# Patient Record
Sex: FEMALE | Race: WHITE | Employment: PART TIME | URBAN - METROPOLITAN AREA
[De-identification: names, ages, dates, MRNs, and addresses within clinical notes are randomized per-mention and may not be internally consistent; named-entity substitution may affect disease eponyms.]

---

## 2019-12-17 ENCOUNTER — OFFICE VISIT (OUTPATIENT)
Dept: NEUROLOGY | Age: 19
End: 2019-12-17

## 2019-12-17 VITALS
OXYGEN SATURATION: 98 % | BODY MASS INDEX: 32.96 KG/M2 | HEIGHT: 67 IN | DIASTOLIC BLOOD PRESSURE: 60 MMHG | SYSTOLIC BLOOD PRESSURE: 100 MMHG | WEIGHT: 210 LBS | RESPIRATION RATE: 16 BRPM | HEART RATE: 66 BPM

## 2019-12-17 DIAGNOSIS — G43.809 MIGRAINE VARIANT WITH HEADACHE: ICD-10-CM

## 2019-12-17 DIAGNOSIS — R42 VERTIGO: Primary | ICD-10-CM

## 2019-12-17 RX ORDER — TOPIRAMATE 50 MG/1
TABLET, FILM COATED ORAL
Qty: 60 TAB | Refills: 5 | Status: SHIPPED | OUTPATIENT
Start: 2019-12-17 | End: 2021-09-08

## 2019-12-17 RX ORDER — FLUOXETINE HYDROCHLORIDE 40 MG/1
CAPSULE ORAL DAILY
COMMUNITY
End: 2021-09-08

## 2019-12-17 NOTE — PROGRESS NOTES
Chief Complaint   Patient presents with    Headache       Referred by: Dr. Opal Tracy is a 68-year-old woman with depression here for vertigo. She was referred by ENT. In July she developed non-positional vertigo present 2 or 3 days/week. Nausea. She has noticed an accompanying headache bifrontal sometimes throbbing pounding with light sensitivity which is new. She has symptoms regardless of movement. Symptoms can last several hours. No double vision or weakness. She works as a  but does not exercise. No history of headaches prior to this onset. No history of headaches prior, meclizine without improvement. Mother reports normal growth and development. Review of Systems   Eyes: Positive for photophobia. Negative for double vision. Gastrointestinal: Positive for nausea. Neurological: Positive for dizziness and headaches. All other systems reviewed and are negative.       Past Medical History:   Diagnosis Date    Chronic mental illness      Family History   Problem Relation Age of Onset    Heart Disease Maternal Grandmother     Neuropathy Maternal Grandmother     Cancer Maternal Grandfather      Social History     Socioeconomic History    Marital status: SINGLE     Spouse name: Not on file    Number of children: Not on file    Years of education: Not on file    Highest education level: Not on file   Occupational History    Not on file   Social Needs    Financial resource strain: Not on file    Food insecurity:     Worry: Not on file     Inability: Not on file    Transportation needs:     Medical: Not on file     Non-medical: Not on file   Tobacco Use    Smoking status: Never Smoker    Smokeless tobacco: Never Used   Substance and Sexual Activity    Alcohol use: Never     Frequency: Never    Drug use: Not on file    Sexual activity: Not on file   Lifestyle    Physical activity:     Days per week: Not on file     Minutes per session: Not on file    Stress: Not on file   Relationships    Social connections:     Talks on phone: Not on file     Gets together: Not on file     Attends Rastafarian service: Not on file     Active member of club or organization: Not on file     Attends meetings of clubs or organizations: Not on file     Relationship status: Not on file    Intimate partner violence:     Fear of current or ex partner: Not on file     Emotionally abused: Not on file     Physically abused: Not on file     Forced sexual activity: Not on file   Other Topics Concern    Not on file   Social History Narrative    Not on file     Current Outpatient Medications   Medication Sig    FLUoxetine (PROZAC) 40 mg capsule Take  by mouth daily.  meclizine HCl (MECLIZINE PO) Take  by mouth.  topiramate (TOPAMAX) 50 mg tablet 1/2 tab twice daily then increase to 1 tab twice daily after 5 days  Indications: Migraine Prevention     No current facility-administered medications for this visit. No Known Allergies      Neurologic Exam     Mental Status   Oriented to person, place, and time. Cranial Nerves   Cranial nerves II through XII intact. No ODE     Motor Exam     Strength   Strength 5/5 throughout. Sensory Exam   Light touch normal.     Gait, Coordination, and Reflexes     Gait  Gait: normal    Coordination   Finger to nose coordination: normal    Tremor   Resting tremor: absent    Reflexes   Right brachioradialis: 2+  Left brachioradialis: 2+  Right biceps: 2+  Left biceps: 2+  Right triceps: 2+  Left triceps: 2+  Right patellar: 3+  Left patellar: 3+  Right achilles: 2+  Left achilles: 2+    Physical Exam   Constitutional: She is oriented to person, place, and time. She appears well-developed and well-nourished. Cardiovascular: Normal rate. Pulmonary/Chest: Effort normal.   Neurological: She is oriented to person, place, and time. She has normal strength.  She has a normal Finger-Nose-Finger Test. Gait normal.   Reflex Scores:       Tricep reflexes are 2+ on the right side and 2+ on the left side. Bicep reflexes are 2+ on the right side and 2+ on the left side. Brachioradialis reflexes are 2+ on the right side and 2+ on the left side. Patellar reflexes are 3+ on the right side and 3+ on the left side. Achilles reflexes are 2+ on the right side and 2+ on the left side. Skin: Skin is warm and dry. Psychiatric: Her behavior is normal.   Vitals reviewed. Visit Vitals  /60   Pulse 66   Resp 16   Ht 5' 7\" (1.702 m)   Wt 95.3 kg (210 lb)   SpO2 98%   BMI 32.89 kg/m²       No labs     No imaging    Assessment and Plan   Diagnoses and all orders for this visit:    1. Vertigo  -     MRI BRAIN WO CONT; Future    2. Migraine variant with headache    Other orders  -     topiramate (TOPAMAX) 50 mg tablet; 1/2 tab twice daily then increase to 1 tab twice daily after 5 days  Indications: Migraine Prevention      17-year-old woman having new onset headaches and non-positional vertigo. Given her age and symptoms we need to make sure were not dealing with a central issue like multiple sclerosis. Concomitantly I am going to start treating her for frequent migraines. Start Topamax twice daily. Side effects have been discussed with her in detail. Questions for her and her mother answered. Keep a headache log. Start exercising. We will see them in follow-up. I reviewed and decided to continue the current medications. This clinical note was dictated with an electronic dictation software that can make unintentional errors. If there are any questions, please contact me directly for clarification. A notice of this visit/encounter being completed has been sent electronically to the patient's PCP and/or referring provider.      [address] [facility], [facility] [address]  Diplomate DAVID

## 2019-12-17 NOTE — PATIENT INSTRUCTIONS
PRESCRIPTION REFILL POLICY [facility] Statement to Patients April 1, 2014 In an effort to ensure the large volume of patient prescription refills is processed in the most efficient and expeditious manner, we are asking our patients to assist us by calling your Pharmacy for all prescription refills, this will include also your  Mail Order Pharmacy. The pharmacy will contact our office electronically to continue the refill process. Please do not wait until the last minute to call your pharmacy. We need at least 48 hours (2days) to fill prescriptions. We also encourage you to call your pharmacy before going to  your prescription to make sure it is ready. With regard to controlled substance prescription refill requests (narcotic refills) that need to be picked up at our office, we ask your cooperation by providing us with at least 72 hours (3days) notice that you will need a refill. We will not refill narcotic prescription refill requests after 4:00pm on any weekday, Monday through Thursday, or after 2:00pm on Fridays, or on the weekends. We encourage everyone to explore another way of getting your prescription refill request processed using [employer], our patient web portal through our electronic medical record system. [employer] is an efficient and effective way to communicate your medication request directly to the office and  downloadable as an aide on your smart phone . [employer] also features a review functionality that allows you to view your medication list as well as leave messages for your physician. Are you ready to get connected? If so please review the attatched instructions or speak to any of our staff to get you set up right away! Thank you so much for your cooperation. Should you have any questions please contact our Practice Administrator. The Physicians and Staff,  [facility]

## 2019-12-17 NOTE — LETTER
12/17/19 Patient: Bre Javier YOB: 2000 Date of Visit: 12/17/2019 Rolanda Malin, [address] [facility] [address] [place] [id number] VIA Facsimile: [phone] Dear GEORGIA Minaya, Thank you for referring Ms. Verónica Mckeon to [address] [place] [address] for evaluation. My notes for this consultation are attached. If you have questions, please do not hesitate to call me. I look forward to following your patient along with you. Sincerely, [address] [facility], DO 
 
12/17/2019 Patient:  Bre Javier YOB: 2000 Date of Visit: 12/17/2019 Dear GEORGIA Minaya 
[address] [facility] [address] [place] [address] VIA Facsimile: [phone] 
 : 
 
 
I was requested by GEORGIA Bruner to evaluate Ms. Cristina Butler  for Chief Complaint Patient presents with  
 Headache Alfredo Angry I am recommending the following:  
 
Diagnoses and all orders for this visit: 1. Vertigo -     MRI BRAIN WO CONT; Future 2. Migraine variant with headache Other orders -     topiramate (TOPAMAX) 50 mg tablet; 1/2 tab twice daily then increase to 1 tab twice daily after 5 days  Indications: Migraine Prevention 
 
 
 
---------------------------------------------------------------------------------------------------------------------- Below is my encounter: Chief Complaint Patient presents with  
 Headache Referred by: Dr. Carlos Arboleda QUIANA Benton is a 53-year-old woman with depression here for vertigo. She was referred by ENT. In July she developed non-positional vertigo present 2 or 3 days/week. Nausea. She has noticed an accompanying headache bifrontal sometimes throbbing pounding with light sensitivity which is new. She has symptoms regardless of movement. Symptoms can last several hours. No double vision or weakness.   She works as a  but does not exercise. No history of headaches prior to this onset. No history of headaches prior, meclizine without improvement. Mother reports normal growth and development. Review of Systems Eyes: Positive for photophobia. Negative for double vision. Gastrointestinal: Positive for nausea. Neurological: Positive for dizziness and headaches. All other systems reviewed and are negative. Past Medical History:  
Diagnosis Date  Chronic mental illness Family History Problem Relation Age of Onset  Heart Disease Maternal Grandmother  Neuropathy Maternal Grandmother  Cancer Maternal Grandfather Social History Socioeconomic History  Marital status: SINGLE Spouse name: Not on file  Number of children: Not on file  Years of education: Not on file  Highest education level: Not on file Occupational History  Not on file Social Needs  Financial resource strain: Not on file  Food insecurity:  
  Worry: Not on file Inability: Not on file  Transportation needs:  
  Medical: Not on file Non-medical: Not on file Tobacco Use  Smoking status: Never Smoker  Smokeless tobacco: Never Used Substance and Sexual Activity  Alcohol use: Never Frequency: Never  Drug use: Not on file  Sexual activity: Not on file Lifestyle  Physical activity:  
  Days per week: Not on file Minutes per session: Not on file  Stress: Not on file Relationships  Social connections:  
  Talks on phone: Not on file Gets together: Not on file Attends Congregation service: Not on file Active member of club or organization: Not on file Attends meetings of clubs or organizations: Not on file Relationship status: Not on file  Intimate partner violence:  
  Fear of current or ex partner: Not on file Emotionally abused: Not on file Physically abused: Not on file Forced sexual activity: Not on file Other Topics Concern  Not on file Social History Narrative  Not on file Current Outpatient Medications Medication Sig  FLUoxetine (PROZAC) 40 mg capsule Take  by mouth daily.  meclizine HCl (MECLIZINE PO) Take  by mouth.  topiramate (TOPAMAX) 50 mg tablet 1/2 tab twice daily then increase to 1 tab twice daily after 5 days  Indications: Migraine Prevention No current facility-administered medications for this visit. No Known Allergies Neurologic Exam  
 
Mental Status Oriented to person, place, and time. Cranial Nerves Cranial nerves II through XII intact. No ODE Motor Exam  
 
Strength Strength 5/5 throughout. Sensory Exam  
Light touch normal.  
 
Gait, Coordination, and Reflexes Gait Gait: normal 
 
Coordination Finger to nose coordination: normal 
 
Tremor Resting tremor: absent Reflexes Right brachioradialis: 2+ Left brachioradialis: 2+ Right biceps: 2+ Left biceps: 2+ Right triceps: 2+ Left triceps: 2+ Right patellar: 3+ Left patellar: 3+ Right achilles: 2+ Left achilles: 2+ Physical Exam  
Constitutional: She is oriented to person, place, and time. She appears well-developed and well-nourished. Cardiovascular: Normal rate. Pulmonary/Chest: Effort normal.  
Neurological: She is oriented to person, place, and time. She has normal strength. She has a normal Finger-Nose-Finger Test. Gait normal.  
Reflex Scores: 
     Tricep reflexes are 2+ on the right side and 2+ on the left side. Bicep reflexes are 2+ on the right side and 2+ on the left side. Brachioradialis reflexes are 2+ on the right side and 2+ on the left side. Patellar reflexes are 3+ on the right side and 3+ on the left side. Achilles reflexes are 2+ on the right side and 2+ on the left side. Skin: Skin is warm and dry. Psychiatric: Her behavior is normal.  
Vitals reviewed. Visit Vitals /60 Pulse 66 Resp 16 Ht 5' 7\" (1.702 m) Wt 95.3 kg (210 lb) SpO2 98% BMI 32.89 kg/m² No labs No imaging Assessment and Plan Diagnoses and all orders for this visit: 1. Vertigo -     MRI BRAIN WO CONT; Future 2. Migraine variant with headache Other orders -     topiramate (TOPAMAX) 50 mg tablet; 1/2 tab twice daily then increase to 1 tab twice daily after 5 days  Indications: Migraine Prevention 17-year-old woman having new onset headaches and non-positional vertigo. Given her age and symptoms we need to make sure were not dealing with a central issue like multiple sclerosis. Concomitantly I am going to start treating her for frequent migraines. Start Topamax twice daily. Side effects have been discussed with her in detail. Questions for her and her mother answered. Keep a headache log. Start exercising. We will see them in follow-up. I reviewed and decided to continue the current medications. This clinical note was dictated with an electronic dictation software that can make unintentional errors. If there are any questions, please contact me directly for clarification. A notice of this visit/encounter being completed has been sent electronically to the patient's PCP and/or referring provider. Thank you for giving me the opportunity to assist in the care of Ms. Juan Carlos Marrero. If you have questions, please do not hesitate to contact me. Sincerely, [address] [facility], DO Neurologist 
Brain Injury Medicine Diplomate DAVID

## 2019-12-17 NOTE — PROGRESS NOTES
Ms. Daniele Nicole presents as a new patient for evaluation of dizziness, headaches and blurred vision. Depression screening done on patient.

## 2019-12-31 ENCOUNTER — HOSPITAL ENCOUNTER (OUTPATIENT)
Dept: MRI IMAGING | Age: 19
Discharge: HOME | End: 2019-12-31
Attending: PSYCHIATRY & NEUROLOGY
Payer: COMMERCIAL

## 2019-12-31 DIAGNOSIS — R42 VERTIGO: ICD-10-CM

## 2019-12-31 PROCEDURE — 70551 MRI BRAIN STEM W/O DYE: CPT

## 2020-03-17 ENCOUNTER — TELEPHONE (OUTPATIENT)
Dept: NEUROLOGY | Age: 20
End: 2020-03-17

## 2020-03-17 NOTE — TELEPHONE ENCOUNTER
Called and LVM for the patient in regards to rescheduling her appointment due to current COVID-19 scare.

## 2021-09-08 ENCOUNTER — APPOINTMENT (OUTPATIENT)
Dept: MRI IMAGING | Age: 21
End: 2021-09-08
Attending: EMERGENCY MEDICINE
Payer: COMMERCIAL

## 2021-09-08 ENCOUNTER — TRANSCRIBE ORDER (OUTPATIENT)
Dept: SCHEDULING | Age: 21
End: 2021-09-08

## 2021-09-08 ENCOUNTER — HOSPITAL ENCOUNTER (EMERGENCY)
Age: 21
Discharge: HOME | End: 2021-09-08
Attending: EMERGENCY MEDICINE
Payer: COMMERCIAL

## 2021-09-08 VITALS
WEIGHT: 230 LBS | DIASTOLIC BLOOD PRESSURE: 53 MMHG | TEMPERATURE: 98.1 F | RESPIRATION RATE: 18 BRPM | HEART RATE: 69 BPM | BODY MASS INDEX: 36.02 KG/M2 | SYSTOLIC BLOOD PRESSURE: 138 MMHG | OXYGEN SATURATION: 100 %

## 2021-09-08 DIAGNOSIS — H57.12 ACUTE LEFT EYE PAIN: Primary | ICD-10-CM

## 2021-09-08 DIAGNOSIS — H46.9 OPTIC NEURITIS: Primary | ICD-10-CM

## 2021-09-08 LAB
ALBUMIN SERPL-MCNC: 3.6 G/DL (ref 3.5–5)
ALBUMIN/GLOB SERPL: 0.7 {RATIO} (ref 1.1–2.2)
ALP SERPL-CCNC: 84 U/L (ref 45–117)
ALT SERPL-CCNC: 25 U/L (ref 12–78)
ANION GAP SERPL CALC-SCNC: 8 MMOL/L (ref 5–15)
AST SERPL-CCNC: 16 U/L (ref 15–37)
BASOPHILS # BLD: 0.1 K/UL (ref 0–0.1)
BASOPHILS NFR BLD: 1 % (ref 0–1)
BILIRUB SERPL-MCNC: 0.3 MG/DL (ref 0.2–1)
BUN SERPL-MCNC: 9 MG/DL (ref 6–20)
BUN/CREAT SERPL: 11 (ref 12–20)
CALCIUM SERPL-MCNC: 8.8 MG/DL (ref 8.5–10.1)
CHLORIDE SERPL-SCNC: 107 MMOL/L (ref 97–108)
CO2 SERPL-SCNC: 24 MMOL/L (ref 21–32)
CREAT SERPL-MCNC: 0.79 MG/DL (ref 0.55–1.02)
CRP SERPL-MCNC: 0.94 MG/DL (ref 0–0.6)
DIFFERENTIAL METHOD BLD: NORMAL
EOSINOPHIL # BLD: 0.2 K/UL (ref 0–0.4)
EOSINOPHIL NFR BLD: 2 % (ref 0–7)
ERYTHROCYTE [DISTWIDTH] IN BLOOD BY AUTOMATED COUNT: 12.8 % (ref 11.5–14.5)
ERYTHROCYTE [SEDIMENTATION RATE] IN BLOOD: 57 MM/HR (ref 0–20)
GLOBULIN SER CALC-MCNC: 4.9 G/DL (ref 2–4)
GLUCOSE SERPL-MCNC: 105 MG/DL (ref 65–100)
HCT VFR BLD AUTO: 37.7 % (ref 35–47)
HGB BLD-MCNC: 12 G/DL (ref 11.5–16)
IMM GRANULOCYTES # BLD AUTO: 0 K/UL (ref 0–0.04)
IMM GRANULOCYTES NFR BLD AUTO: 0 % (ref 0–0.5)
LYMPHOCYTES # BLD: 3.5 K/UL (ref 0.8–3.5)
LYMPHOCYTES NFR BLD: 36 % (ref 12–49)
MCH RBC QN AUTO: 27.6 PG (ref 26–34)
MCHC RBC AUTO-ENTMCNC: 31.8 G/DL (ref 30–36.5)
MCV RBC AUTO: 86.7 FL (ref 80–99)
MONOCYTES # BLD: 0.7 K/UL (ref 0–1)
MONOCYTES NFR BLD: 8 % (ref 5–13)
NEUTS SEG # BLD: 5.2 K/UL (ref 1.8–8)
NEUTS SEG NFR BLD: 54 % (ref 32–75)
NRBC # BLD: 0 K/UL (ref 0–0.01)
NRBC BLD-RTO: 0 PER 100 WBC
PLATELET # BLD AUTO: 321 K/UL (ref 150–400)
PMV BLD AUTO: 9.4 FL (ref 8.9–12.9)
POTASSIUM SERPL-SCNC: 4 MMOL/L (ref 3.5–5.1)
PROT SERPL-MCNC: 8.5 G/DL (ref 6.4–8.2)
RBC # BLD AUTO: 4.35 M/UL (ref 3.8–5.2)
SODIUM SERPL-SCNC: 139 MMOL/L (ref 136–145)
WBC # BLD AUTO: 9.7 K/UL (ref 3.6–11)

## 2021-09-08 PROCEDURE — 36415 COLL VENOUS BLD VENIPUNCTURE: CPT

## 2021-09-08 PROCEDURE — 99283 EMERGENCY DEPT VISIT LOW MDM: CPT

## 2021-09-08 PROCEDURE — 70543 MRI ORBT/FAC/NCK W/O &W/DYE: CPT

## 2021-09-08 PROCEDURE — 85652 RBC SED RATE AUTOMATED: CPT

## 2021-09-08 PROCEDURE — 85025 COMPLETE CBC W/AUTO DIFF WBC: CPT

## 2021-09-08 PROCEDURE — 74011250636 HC RX REV CODE- 250/636: Performed by: EMERGENCY MEDICINE

## 2021-09-08 PROCEDURE — A9576 INJ PROHANCE MULTIPACK: HCPCS | Performed by: EMERGENCY MEDICINE

## 2021-09-08 PROCEDURE — 80053 COMPREHEN METABOLIC PANEL: CPT

## 2021-09-08 PROCEDURE — 86140 C-REACTIVE PROTEIN: CPT

## 2021-09-08 RX ORDER — OXYBUTYNIN CHLORIDE 10 MG/1
10 TABLET, EXTENDED RELEASE ORAL DAILY
COMMUNITY
Start: 2021-08-12 | End: 2021-10-05

## 2021-09-08 RX ADMIN — GADOTERIDOL 20 ML: 279.3 INJECTION, SOLUTION INTRAVENOUS at 20:48

## 2021-09-08 NOTE — ED TRIAGE NOTES
Patient presents ambulatory to treatment area with a steady gait. Patient states she went to [facility] today due to blurred vision and pain in the left eye. Patient was noted to have optic nerve enlargement. Here for workup.

## 2021-09-09 ENCOUNTER — TELEPHONE (OUTPATIENT)
Dept: NEUROLOGY | Age: 21
End: 2021-09-09

## 2021-09-09 NOTE — TELEPHONE ENCOUNTER
Patient's mother calling stating patient is showing signs of MS and she is very worried. Unable to schedule with NP.  Schedule not available

## 2021-09-09 NOTE — PROGRESS NOTES
Discussed final result with Dr. Ben Mancilla who states no new recommendations, patient to f/u as directed at discharge.

## 2021-09-09 NOTE — ED PROVIDER NOTES
39-year-old female who presents to the ED with a complaint of visual disturbance and left eye pain over the past week. She was seen by her ophthalmologist who sent her directly to the ER for evaluation for possible optic neuritis of the left eye. The patient denies any fever, headache, neck or back pain, chest pain, shortness of breath, nausea, vomiting, diarrhea, constipation, dizziness, extremity weakness or numbness, sick contact, skin rash and recent travel. Past Medical History:   Diagnosis Date    Chronic mental illness     Overactive bladder        History reviewed. No pertinent surgical history. Family History:   Problem Relation Age of Onset    Heart Disease Maternal Grandmother     Neuropathy Maternal Grandmother     Cancer Maternal Grandfather        Social History     Socioeconomic History    Marital status: SINGLE     Spouse name: Not on file    Number of children: Not on file    Years of education: Not on file    Highest education level: Not on file   Occupational History    Not on file   Tobacco Use    Smoking status: Never Smoker    Smokeless tobacco: Never Used   Substance and Sexual Activity    Alcohol use: Never    Drug use: Not on file    Sexual activity: Not on file   Other Topics Concern    Not on file   Social History Narrative    Not on file     Social Determinants of Health     Financial Resource Strain:     Difficulty of Paying Living Expenses:    Food Insecurity:     Worried About Running Out of Food in the Last Year:     Ran Out of Food in the Last Year:    Transportation Needs:     Lack of Transportation (Medical):      Lack of Transportation (Non-Medical):    Physical Activity:     Days of Exercise per Week:     Minutes of Exercise per Session:    Stress:     Feeling of Stress :    Social Connections:     Frequency of Communication with Friends and Family:     Frequency of Social Gatherings with Friends and Family:     Attends [employer] Services:     Active Member of Clubs or Organizations:     Attends Club or Organization Meetings:     Marital Status:    Intimate Partner Violence:     Fear of Current or Ex-Partner:     Emotionally Abused:     Physically Abused:     Sexually Abused: ALLERGIES: Patient has no known allergies. Review of Systems   Constitutional: Negative. Eyes: Negative for pain. All other systems reviewed and are negative. Vitals:    09/08/21 1908 09/08/21 2027   BP: (!) 138/53    Pulse: 69    Resp: 18    Temp: 98.1 °F (36.7 °C)    SpO2: 100%    Weight:  104.3 kg (230 lb)            Physical Exam  Vitals and nursing note reviewed. Exam conducted with a chaperone present. CONSTITUTIONAL: Well-appearing; well-nourished; in no apparent distress  HEAD: Normocephalic; atraumatic  EYES: PERRL; EOM intact; conjunctiva and sclera are clear bilaterally. ENT: No rhinorrhea; normal pharynx with no tonsillar hypertrophy; mucous membranes pink/moist, no erythema, no exudate. NECK: Supple; non-tender; no cervical lymphadenopathy  CARD: Normal S1, S2; no murmurs, rubs, or gallops. Regular rate and rhythm. RESP: Normal respiratory effort; breath sounds clear and equal bilaterally; no wheezes, rhonchi, or rales. ABD: Normal bowel sounds; non-distended; non-tender; no palpable organomegaly, no masses, no bruits. Back Exam: Normal inspection; no vertebral point tenderness, no CVA tenderness. Normal range of motion. EXT: Normal ROM in all four extremities; non-tender to palpation; no swelling or deformity; distal pulses are normal, no edema. SKIN: Warm; dry; no rash.   NEURO:Alert and oriented x 3, coherent, VERONICA-XII grossly intact, sensory and motor are non-focal.        MDM  Number of Diagnoses or Management Options  Acute left eye pain  Diagnosis management comments: Assessment: Acute left eye pain with visual disturbance rule out multiple sclerosis, malignancy, thyroid disease, education, symptomatic treatment/serial exam    Plan: Lab/education, reassurance, symptomatic treatment/MRI of the brain serial exam/ Monitor and Reevaluate. Amount and/or Complexity of Data Reviewed  Clinical lab tests: ordered and reviewed  Tests in the radiology section of CPT®: ordered and reviewed  Tests in the medicine section of CPT®: ordered and reviewed  Discussion of test results with the performing providers: yes  Decide to obtain previous medical records or to obtain history from someone other than the patient: yes  Obtain history from someone other than the patient: yes  Review and summarize past medical records: yes  Discuss the patient with other providers: yes  Independent visualization of images, tracings, or specimens: yes    Risk of Complications, Morbidity, and/or Mortality  Presenting problems: moderate  Diagnostic procedures: moderate  Management options: moderate    Patient Progress  Patient progress: stable         Procedures    CONSULT NOTE:  Allison Joaquin MD spoke with Dr. Bienvenido Gonzalez of [facility]. Discussed patient's presentation, history, physical assessment, and available diagnostic results. Wants patient discharged home and will follow up in the office for outpatient reevaluation and work-up as deemed appropriate. Progress Note:   Pt has been reexamined by Júnior Velasquez MD. Pt is feeling much better. Symptoms have improved. All available results have been reviewed with pt and any available family. Pt understands sx, dx, and tx in ED. Care plan has been outlined and questions have been answered. Pt is ready to go home. Will send home on acute left eye pain instruction. . Outpatient referral with PCP/neuro-ophthalmologist as needed. Written by Júnior Velasquez MD,9:57 PM    .   .

## 2021-09-13 ENCOUNTER — HOSPITAL ENCOUNTER (INPATIENT)
Age: 21
LOS: 6 days | Discharge: HOME | DRG: 123 | End: 2021-09-19
Attending: INTERNAL MEDICINE | Admitting: INTERNAL MEDICINE
Payer: COMMERCIAL

## 2021-09-13 DIAGNOSIS — H46.9 OPTIC NEURITIS: ICD-10-CM

## 2021-09-13 DIAGNOSIS — G37.9 DEMYELINATING LESION (HCC): ICD-10-CM

## 2021-09-13 DIAGNOSIS — H46.9 LEFT OPTIC NEURITIS: Primary | ICD-10-CM

## 2021-09-13 DIAGNOSIS — F41.9 ANXIETY: ICD-10-CM

## 2021-09-13 DIAGNOSIS — H57.12 LEFT EYE PAIN: ICD-10-CM

## 2021-09-13 LAB
ALBUMIN SERPL-MCNC: 3.4 G/DL (ref 3.5–5)
ALBUMIN/GLOB SERPL: 0.6 {RATIO} (ref 1.1–2.2)
ALP SERPL-CCNC: 89 U/L (ref 45–117)
ALT SERPL-CCNC: 25 U/L (ref 12–78)
ANION GAP SERPL CALC-SCNC: 3 MMOL/L (ref 5–15)
AST SERPL-CCNC: 23 U/L (ref 15–37)
BASOPHILS # BLD: 0.1 K/UL (ref 0–0.1)
BASOPHILS NFR BLD: 1 % (ref 0–1)
BILIRUB SERPL-MCNC: 0.9 MG/DL (ref 0.2–1)
BUN SERPL-MCNC: 10 MG/DL (ref 6–20)
BUN/CREAT SERPL: 16 (ref 12–20)
CALCIUM SERPL-MCNC: 8.8 MG/DL (ref 8.5–10.1)
CHLORIDE SERPL-SCNC: 107 MMOL/L (ref 97–108)
CO2 SERPL-SCNC: 26 MMOL/L (ref 21–32)
CREAT SERPL-MCNC: 0.64 MG/DL (ref 0.55–1.02)
CRP SERPL-MCNC: 1.38 MG/DL (ref 0–0.6)
DIFFERENTIAL METHOD BLD: ABNORMAL
EOSINOPHIL # BLD: 0.2 K/UL (ref 0–0.4)
EOSINOPHIL NFR BLD: 2 % (ref 0–7)
ERYTHROCYTE [DISTWIDTH] IN BLOOD BY AUTOMATED COUNT: 12.6 % (ref 11.5–14.5)
ERYTHROCYTE [SEDIMENTATION RATE] IN BLOOD: 54 MM/HR (ref 0–20)
GLOBULIN SER CALC-MCNC: 5.3 G/DL (ref 2–4)
GLUCOSE SERPL-MCNC: 98 MG/DL (ref 65–100)
HCT VFR BLD AUTO: 36.6 % (ref 35–47)
HGB BLD-MCNC: 11.6 G/DL (ref 11.5–16)
IMM GRANULOCYTES # BLD AUTO: 0 K/UL (ref 0–0.04)
IMM GRANULOCYTES NFR BLD AUTO: 0 % (ref 0–0.5)
LYMPHOCYTES # BLD: 2.6 K/UL (ref 0.8–3.5)
LYMPHOCYTES NFR BLD: 23 % (ref 12–49)
MCH RBC QN AUTO: 27 PG (ref 26–34)
MCHC RBC AUTO-ENTMCNC: 31.7 G/DL (ref 30–36.5)
MCV RBC AUTO: 85.1 FL (ref 80–99)
MONOCYTES # BLD: 0.8 K/UL (ref 0–1)
MONOCYTES NFR BLD: 8 % (ref 5–13)
NEUTS SEG # BLD: 7.4 K/UL (ref 1.8–8)
NEUTS SEG NFR BLD: 66 % (ref 32–75)
NRBC # BLD: 0 K/UL (ref 0–0.01)
NRBC BLD-RTO: 0 PER 100 WBC
PLATELET # BLD AUTO: 286 K/UL (ref 150–400)
PMV BLD AUTO: 9.9 FL (ref 8.9–12.9)
POTASSIUM SERPL-SCNC: 5 MMOL/L (ref 3.5–5.1)
PROT SERPL-MCNC: 8.7 G/DL (ref 6.4–8.2)
RBC # BLD AUTO: 4.3 M/UL (ref 3.8–5.2)
SODIUM SERPL-SCNC: 136 MMOL/L (ref 136–145)
WBC # BLD AUTO: 11.2 K/UL (ref 3.6–11)

## 2021-09-13 PROCEDURE — 74011250637 HC RX REV CODE- 250/637: Performed by: INTERNAL MEDICINE

## 2021-09-13 PROCEDURE — 74011000258 HC RX REV CODE- 258: Performed by: INTERNAL MEDICINE

## 2021-09-13 PROCEDURE — 85652 RBC SED RATE AUTOMATED: CPT

## 2021-09-13 PROCEDURE — 80053 COMPREHEN METABOLIC PANEL: CPT

## 2021-09-13 PROCEDURE — 65270000029 HC RM PRIVATE

## 2021-09-13 PROCEDURE — 85025 COMPLETE CBC W/AUTO DIFF WBC: CPT

## 2021-09-13 PROCEDURE — 86140 C-REACTIVE PROTEIN: CPT

## 2021-09-13 PROCEDURE — 36415 COLL VENOUS BLD VENIPUNCTURE: CPT

## 2021-09-13 PROCEDURE — 99282 EMERGENCY DEPT VISIT SF MDM: CPT

## 2021-09-13 PROCEDURE — 74011250636 HC RX REV CODE- 250/636: Performed by: INTERNAL MEDICINE

## 2021-09-13 RX ORDER — ONDANSETRON 2 MG/ML
4 INJECTION INTRAMUSCULAR; INTRAVENOUS
Status: DISCONTINUED | OUTPATIENT
Start: 2021-09-13 | End: 2021-09-19 | Stop reason: HOSPADM

## 2021-09-13 RX ORDER — ACETAMINOPHEN 650 MG/1
650 SUPPOSITORY RECTAL
Status: DISCONTINUED | OUTPATIENT
Start: 2021-09-13 | End: 2021-09-19 | Stop reason: HOSPADM

## 2021-09-13 RX ORDER — ACETAMINOPHEN 325 MG/1
650 TABLET ORAL
Status: DISCONTINUED | OUTPATIENT
Start: 2021-09-13 | End: 2021-09-19 | Stop reason: HOSPADM

## 2021-09-13 RX ORDER — POLYETHYLENE GLYCOL 3350 17 G/17G
17 POWDER, FOR SOLUTION ORAL DAILY PRN
Status: DISCONTINUED | OUTPATIENT
Start: 2021-09-13 | End: 2021-09-19 | Stop reason: HOSPADM

## 2021-09-13 RX ORDER — SODIUM CHLORIDE 0.9 % (FLUSH) 0.9 %
5-40 SYRINGE (ML) INJECTION AS NEEDED
Status: DISCONTINUED | OUTPATIENT
Start: 2021-09-13 | End: 2021-09-19 | Stop reason: HOSPADM

## 2021-09-13 RX ORDER — SODIUM CHLORIDE 0.9 % (FLUSH) 0.9 %
5-40 SYRINGE (ML) INJECTION EVERY 8 HOURS
Status: DISCONTINUED | OUTPATIENT
Start: 2021-09-13 | End: 2021-09-19 | Stop reason: HOSPADM

## 2021-09-13 RX ORDER — ENOXAPARIN SODIUM 100 MG/ML
40 INJECTION SUBCUTANEOUS DAILY
Status: DISCONTINUED | OUTPATIENT
Start: 2021-09-14 | End: 2021-09-19 | Stop reason: HOSPADM

## 2021-09-13 RX ORDER — IMIPRAMINE HYDROCHLORIDE 25 MG/1
25 TABLET ORAL
Status: DISCONTINUED | OUTPATIENT
Start: 2021-09-13 | End: 2021-09-19 | Stop reason: HOSPADM

## 2021-09-13 RX ORDER — ONDANSETRON 4 MG/1
4 TABLET, ORALLY DISINTEGRATING ORAL
Status: DISCONTINUED | OUTPATIENT
Start: 2021-09-13 | End: 2021-09-19 | Stop reason: HOSPADM

## 2021-09-13 RX ADMIN — IMIPRAMINE HYDROCHLORIDE 25 MG: 25 TABLET ORAL at 23:49

## 2021-09-13 RX ADMIN — SODIUM CHLORIDE: 9 INJECTION, SOLUTION INTRAVENOUS at 23:47

## 2021-09-13 NOTE — ED PROVIDER NOTES
Patient is a 69-year-old female who presents with 11 days of pain to her left eye and approximately 7 days of blurred vision. He states she has been seen at [facility] where she had an MRI obtained. Has had 2 visits with [facility], most recent was today. Reports that they sent her to the ED for IV steroids out of concern for unilateral optic neuritis and possible admission with further neurologic work-up out of concern for MS. Denies further focal weakness, numbness. Eye Pain   Associated symptoms include pain. Pertinent negatives include no eye redness. Past Medical History:   Diagnosis Date    Chronic mental illness     Overactive bladder        No past surgical history on file. Family History:   Problem Relation Age of Onset    Heart Disease Maternal Grandmother     Neuropathy Maternal Grandmother     Cancer Maternal Grandfather        Social History     Socioeconomic History    Marital status: SINGLE     Spouse name: Not on file    Number of children: Not on file    Years of education: Not on file    Highest education level: Not on file   Occupational History    Not on file   Tobacco Use    Smoking status: Never Smoker    Smokeless tobacco: Never Used   Substance and Sexual Activity    Alcohol use: Never    Drug use: Not on file    Sexual activity: Not on file   Other Topics Concern    Not on file   Social History Narrative    Not on file     Social Determinants of Health     Financial Resource Strain:     Difficulty of Paying Living Expenses:    Food Insecurity:     Worried About Running Out of Food in the Last Year:     Ran Out of Food in the Last Year:    Transportation Needs:     Lack of Transportation (Medical):      Lack of Transportation (Non-Medical):    Physical Activity:     Days of Exercise per Week:     Minutes of Exercise per Session:    Stress:     Feeling of Stress :    Social Connections:     Frequency of Communication with Friends and Family:     Frequency of Social Gatherings with Friends and Family:     Attends Alevism Services:     Active Member of Clubs or Organizations:     Attends Club or Organization Meetings:     Marital Status:    Intimate Partner Violence:     Fear of Current or Ex-Partner:     Emotionally Abused:     Physically Abused:     Sexually Abused: ALLERGIES: Patient has no known allergies. Review of Systems   Eyes: Positive for pain and visual disturbance. Negative for redness. Vitals:    09/13/21 1701   BP: 112/75   Pulse: 94   Resp: 16   Temp: 97.5 °F (36.4 °C)   SpO2: 99%   Weight: 106.6 kg (235 lb)   Height: 5' 7\" (1.702 m)            Physical Exam  Vitals and nursing note reviewed. Constitutional:       General: She is not in acute distress. Appearance: She is well-developed. She is not diaphoretic. HENT:      Head: Normocephalic and atraumatic. Nose: Nose normal.   Eyes:      Extraocular Movements: Extraocular movements intact. Conjunctiva/sclera: Conjunctivae normal.      Right eye: Right conjunctiva is not injected. Left eye: Left conjunctiva is not injected. Pupils: Pupils are equal, round, and reactive to light. Cardiovascular:      Rate and Rhythm: Normal rate and regular rhythm. Heart sounds: Normal heart sounds. Pulmonary:      Effort: Pulmonary effort is normal.      Breath sounds: Normal breath sounds. Abdominal:      General: There is no distension. Palpations: Abdomen is soft. Tenderness: There is no abdominal tenderness. Musculoskeletal:         General: No tenderness. Cervical back: Neck supple. Skin:     General: Skin is warm and dry. Neurological:      Mental Status: She is alert and oriented to person, place, and time. GCS: GCS eye subscore is 4. GCS verbal subscore is 5. GCS motor subscore is 6. Cranial Nerves: No cranial nerve deficit. Sensory: No sensory deficit.       Coordination: Coordination normal.          [facility]  ED Course as of Sep 13 1950   Mon Sep 13, 2021   1727 Pt reports pain to her L eye x 11 days and blurred vision x 7 days. States she was seen by [facility] earlier today. States she had MRI and blood work performed on Wednesday. Presents with paperwork from [facility]. States they sent her here for neurology consultation for expedited workup of unilateral optic neuritis and consideration of trial of IV steroid. [MW]      ED Course User Index  [MW] King Wheatley PA-C     Labs returned showing WBC 11.2, elevated ESR of 54, CRP 1.38 concern for optic neuritis, possible MS. Will admit to the hospitalist service for further care and assessment. Procedures    Perfect Serve Consult for Admission  7:49 PM    ED Room Number: CW/CW  Patient Name and age:  Xochilt Baeza 23 y.o.  female  Working Diagnosis:   1. Left optic neuritis    2. Left eye pain        COVID-19 Suspicion:  no  Sepsis present:  no  Reassessment needed: no  Code Status:  Full Code  Readmission: no  Isolation Requirements:  no  Recommended Level of Care:  med/surg  Department:[facility] ED - [phone]  Other: 25-year-old female presents for evaluation of suspected left optic neuritis from [facility]. Reportedly had MRI last week which showed findings concerning for it versus possible MS. Will likely need more MRI eval and will need neurology consultation and IV steroids. Labs consistent with it with elevated ESR/CRP.

## 2021-09-13 NOTE — ED TRIAGE NOTES
Patient reports left optic neuritis diagnosed by [address] [place] [address]. Reports pain to the left eye with blurry vision. Patient was sent to ED for IV steroids and neuro consult to R/O MS. Patient is ambulatory in Triage.

## 2021-09-14 ENCOUNTER — APPOINTMENT (OUTPATIENT)
Dept: MRI IMAGING | Age: 21
DRG: 123 | End: 2021-09-14
Attending: PSYCHIATRY & NEUROLOGY
Payer: COMMERCIAL

## 2021-09-14 PROCEDURE — 72157 MRI CHEST SPINE W/O & W/DYE: CPT

## 2021-09-14 PROCEDURE — 74011250636 HC RX REV CODE- 250/636: Performed by: INTERNAL MEDICINE

## 2021-09-14 PROCEDURE — 74011250636 HC RX REV CODE- 250/636

## 2021-09-14 PROCEDURE — 36415 COLL VENOUS BLD VENIPUNCTURE: CPT

## 2021-09-14 PROCEDURE — 74011000258 HC RX REV CODE- 258: Performed by: INTERNAL MEDICINE

## 2021-09-14 PROCEDURE — 99255 IP/OBS CONSLTJ NEW/EST HI 80: CPT | Performed by: PSYCHIATRY & NEUROLOGY

## 2021-09-14 PROCEDURE — 65270000029 HC RM PRIVATE

## 2021-09-14 PROCEDURE — 72156 MRI NECK SPINE W/O & W/DYE: CPT

## 2021-09-14 PROCEDURE — 74011250636 HC RX REV CODE- 250/636: Performed by: RADIOLOGY

## 2021-09-14 PROCEDURE — 83520 IMMUNOASSAY QUANT NOS NONAB: CPT

## 2021-09-14 PROCEDURE — 74011250637 HC RX REV CODE- 250/637: Performed by: INTERNAL MEDICINE

## 2021-09-14 PROCEDURE — 74011000258 HC RX REV CODE- 258

## 2021-09-14 PROCEDURE — A9576 INJ PROHANCE MULTIPACK: HCPCS | Performed by: RADIOLOGY

## 2021-09-14 RX ORDER — IMIPRAMINE HYDROCHLORIDE 25 MG/1
25 TABLET ORAL
COMMUNITY

## 2021-09-14 RX ADMIN — Medication 10 ML: at 02:34

## 2021-09-14 RX ADMIN — SODIUM CHLORIDE: 9 INJECTION, SOLUTION INTRAVENOUS at 18:06

## 2021-09-14 RX ADMIN — IMIPRAMINE HYDROCHLORIDE 25 MG: 25 TABLET ORAL at 21:59

## 2021-09-14 RX ADMIN — SODIUM CHLORIDE: 9 INJECTION, SOLUTION INTRAVENOUS at 18:08

## 2021-09-14 RX ADMIN — GADOTERIDOL 20 ML: 279.3 INJECTION, SOLUTION INTRAVENOUS at 14:06

## 2021-09-14 RX ADMIN — Medication 10 ML: at 21:59

## 2021-09-14 RX ADMIN — Medication 10 ML: at 18:08

## 2021-09-14 RX ADMIN — Medication 10 ML: at 06:22

## 2021-09-14 RX ADMIN — ENOXAPARIN SODIUM 40 MG: 40 INJECTION SUBCUTANEOUS at 09:41

## 2021-09-14 RX ADMIN — SODIUM CHLORIDE: 9 INJECTION, SOLUTION INTRAVENOUS at 06:20

## 2021-09-14 NOTE — ROUTINE PROCESS
TRANSFER - OUT REPORT:    Verbal report given to [facility] RN(name) on Landon Mcdonald  being transferred to 4th floor (unit) for routine progression of care       Report consisted of patients Situation, Background, Assessment and   Recommendations(SBAR). Information from the following report(s) SBAR, ED Summary, [facility] and Recent Results was reviewed with the receiving nurse. Lines:   Peripheral IV 25/17/42 Left Basilic (Active)   Site Assessment Clean, dry, & intact 09/13/21 2319   Phlebitis Assessment 0 09/13/21 2319   Infiltration Assessment 0 09/13/21 2319   Dressing Status Clean, dry, & intact 09/13/21 2319   Dressing Type Transparent;Tape 09/13/21 2319   Hub Color/Line Status Pink 09/13/21 2319        Opportunity for questions and clarification was provided.       Patient transported with:   [facility]

## 2021-09-14 NOTE — H&P
Hospitalist Admission Note    NAME: Nando Beckett   :  2000   MRN:  [id number]     Date/Time:  2021 8:32 PM    Patient PCP: Partha Hand, [address]  ________________________________________________________________________    Given the patient's current clinical presentation, I have a high level of concern for decompensation if discharged from the emergency department. Complex decision making was performed, which includes reviewing the patient's available past medical records, laboratory results, and x-ray films. My assessment of this patient's clinical condition and my plan of care is as follows. Assessment / Plan:    #Acute optic neuritis: Reviewed Ophtho notes from today indicating highly likely diagnosis based on nerve edema, MRI findings. She endorses balance issues but PEx is unremarkable today and prior MRIs w/o obvious MS findings. She endorses bilateral paresthesia, but not weakness, but NMO possible. - Start methylpred 25mg IV q6h   - Neuro c/s for consideration of LP, further imaging   - Ophtho rec labs include:     - anti-aquaporin-4, MOG, TB quantiferron    #Nocturnal enuresis: Recently started imipramine    #Obesity: Counseled; monitor BG on steroids        I have personally reviewed the radiographs, laboratory data in Epic and decisions and statements above are based partially on this personal interpretation. Code Status: Full Code  DVT Prophylaxis: hold for possible LP  GI Prophylaxis: not indicated       Subjective:   CHIEF COMPLAINT: \"L eye pain, blurry vision\"    HISTORY OF PRESENT ILLNESS:     Xavier Horn is a 23 y.o.  F with minimal PMHx presents from Neurophthalmology with concern for optic neuritis. She reports that she began having dull OS pain about 14 days, ago, which became quite painful 11 days ago, and then started having vision loss about 7 days ago. She presented to [facility] at which time MRI brain was obtained and expedited neuroptho was coordinated.        Past Medical History:   Diagnosis Date    Chronic mental illness     Overactive bladder       No past surgical history on file. Social History     Tobacco Use    Smoking status: Never Smoker    Smokeless tobacco: Never Used   Substance Use Topics    Alcohol use: Never      Family History   Problem Relation Age of Onset    Heart Disease Maternal Grandmother     Neuropathy Maternal Grandmother     Cancer Maternal Grandfather         No Known Allergies     Prior to Admission medications    Medication Sig Start Date End Date Taking? Authorizing Provider   oxybutynin chloride XL (DITROPAN XL) 10 mg CR tablet Take 10 mg by mouth daily.  8/12/21   Other, MD Jared     REVIEW OF SYSTEMS:  See HPI for details  General: negative for fever, chills, sweats, weakness, weight loss  Eyes: ++ blurred vision, eye pain, loss of vision, diplopia  Ear Nose and Throat: negative for rhinorrhea, pharyngitis, otalgia, tinnitus, speech or swallowing difficulties  Respiratory:  negative for pleuritic pain, cough, sputum production, wheezing, SOB, REYNAGA  Cardiology:  negative for chest pain, palpitations, orthopnea, PND, edema, syncope   Gastrointestinal: negative for abdominal pain, N/V, dysphagia, change in bowel habits, bleeding  Genitourinary: negative for frequency, urgency, dysuria, hematuria, incontinence  Muskuloskeletal : negative for arthralgia, myalgia  Hematology: negative for easy bruising, bleeding, lymphadenopathy  Dermatological: negative for rash, ulceration, mole change, new lesion  Endocrine: negative for hot flashes or polydipsia  Neurological: negative for headache,+ dizziness, confusion, focal weakness, +paresthesia, memory loss, +gait disturbance  Psychological: negative for anxiety, depression, agitation      Objective:   VITALS:    Visit Vitals  /75 (BP 1 Location: Right upper arm, BP Patient Position: At rest)   Pulse 94   Temp 97.5 °F (36.4 °C)   Resp 16   Ht 5' 7\" (1.702 m)   Wt 106.6 kg (235 lb)   SpO2 99% BMI 36.81 kg/m²     PHYSICAL EXAM:    Physical Exam:    Gen: Well-developed, well-nourished, in no acute distress  HEENT:  Pink conjunctivae, PERRL, hearing intact to voice, moist mucous membranes  Neck: Supple, without masses, thyroid non-tender  Resp: No accessory muscle use, clear breath sounds without wheezes rales or rhonchi  Card: No murmurs, normal S1, S2 without thrills, bruits or peripheral edema  Abd:  Soft, non-tender, non-distended, normoactive bowel sounds are present, no palpable organomegaly and no detectable hernias  Lymph:  No cervical or inguinal adenopathy  Musc: No cyanosis or clubbing  Skin: No rashes or ulcers, skin turgor is good  Neuro:  Cranial nerves are grossly intact, no focal motor weakness, follows commands appropriately, neg romberg, no pronator drif; heel toe fine  Psych:  Good insight, oriented to person, place and time, alert          _______________________________________________________________________  Care Plan discussed with:    Comments   Patient x Discussed with patient in room. POC outlined and Questions answered    Family  x    RN x    Care Manager                    Consultant:  charito MYRICK MD   _______________________________________________________________________  Recommended Disposition:   Home with Family x   HH/PT/OT/RN    SNF/LTC    KARISSA    ________________________________________________________________________  TOTAL TIME:  35 Minutes        Comments   >50% of visit spent in counseling and coordination of care  Chart reviewed  Discussion with patient and/or family and questions answered     ________________________________________________________________________  Signed: Roberta Monge MD    This note will not be viewable in [address] Procedures: see electronic medical records for all procedures/Xrays and details which were not copied into this note but were reviewed prior to creation of Plan.     LAB DATA REVIEWED:    Recent Results (from the past 24 hour(s)) CBC WITH AUTOMATED DIFF    Collection Time: 09/13/21  6:37 PM   Result Value Ref Range    WBC 11.2 (H) 3.6 - 11.0 K/uL    RBC 4.30 3.80 - 5.20 M/uL    HGB 11.6 11.5 - 16.0 g/dL    HCT 36.6 35.0 - 47.0 %    MCV 85.1 80.0 - 99.0 FL    MCH 27.0 26.0 - 34.0 PG    MCHC 31.7 30.0 - 36.5 g/dL    RDW 12.6 11.5 - 14.5 %    PLATELET 733 444 - 492 K/uL    MPV 9.9 8.9 - 12.9 FL    NRBC 0.0 0  WBC    ABSOLUTE NRBC 0.00 0.00 - 0.01 K/uL    NEUTROPHILS 66 32 - 75 %    LYMPHOCYTES 23 12 - 49 %    MONOCYTES 8 5 - 13 %    EOSINOPHILS 2 0 - 7 %    BASOPHILS 1 0 - 1 %    IMMATURE GRANULOCYTES 0 0.0 - 0.5 %    ABS. NEUTROPHILS 7.4 1.8 - 8.0 K/UL    ABS. LYMPHOCYTES 2.6 0.8 - 3.5 K/UL    ABS. MONOCYTES 0.8 0.0 - 1.0 K/UL    ABS. EOSINOPHILS 0.2 0.0 - 0.4 K/UL    ABS. BASOPHILS 0.1 0.0 - 0.1 K/UL    ABS. IMM. GRANS. 0.0 0.00 - 0.04 K/UL    DF AUTOMATED     METABOLIC PANEL, COMPREHENSIVE    Collection Time: 09/13/21  6:37 PM   Result Value Ref Range    Sodium 136 136 - 145 mmol/L    Potassium 5.0 3.5 - 5.1 mmol/L    Chloride 107 97 - 108 mmol/L    CO2 26 21 - 32 mmol/L    Anion gap 3 (L) 5 - 15 mmol/L    Glucose 98 65 - 100 mg/dL    BUN 10 6 - 20 MG/DL    Creatinine 0.64 0.55 - 1.02 MG/DL    BUN/Creatinine ratio 16 12 - 20      GFR est AA >60 >60 ml/min/1.73m2    GFR est non-AA >60 >60 ml/min/1.73m2    Calcium 8.8 8.5 - 10.1 MG/DL    Bilirubin, total 0.9 0.2 - 1.0 MG/DL    ALT (SGPT) 25 12 - 78 U/L    AST (SGOT) 23 15 - 37 U/L    Alk.  phosphatase 89 45 - 117 U/L    Protein, total 8.7 (H) 6.4 - 8.2 g/dL    Albumin 3.4 (L) 3.5 - 5.0 g/dL    Globulin 5.3 (H) 2.0 - 4.0 g/dL    A-G Ratio 0.6 (L) 1.1 - 2.2     SED RATE (ESR)    Collection Time: 09/13/21  6:37 PM   Result Value Ref Range    Sed rate, automated 54 (H) 0 - 20 mm/hr   C REACTIVE PROTEIN, QT    Collection Time: 09/13/21  6:37 PM   Result Value Ref Range    C-Reactive protein 1.38 (H) 0.00 - 0.60 mg/dL

## 2021-09-14 NOTE — PROGRESS NOTES
Reason for Admission:  Opic neuritis                   RUR Score:   7%               Plan for utilizing home health:     No     PCP: First and Last name:  Patricia Unger   Name of Practice: [facility]   Are you a current patient: Yes/No: yes   Approximate date of last visit: within the past year   Can you participate in a virtual visit with your PCP:                     Current Advanced Directive/Advance Care Plan: Full Code      Healthcare Decision Maker:  Romana Slick, mother - [phone]                           Transition of Care Plan:        Initial assessment information was obtained by phone from patient's mother, Romana Slick. Patient lives with her parents and sister at charted address in [place]. She is independent with ADLs and drives. Patient does not own/use any DME and uses [facility]. pharmacy on [address] 1. Await further evaluation  2. Neurology consulted  3. CM will follow   4. Home with family when medically stable  5. Outpatient follow-up  6. Family will provide transportation at discharge    Care Management Interventions  PCP Verified by CM:  Yes  Support Systems: Parent(s)  Confirm Follow Up Transport: Family  The Plan for Transition of Care is Related to the Following Treatment Goals : Opic neuritis  Discharge Location  Discharge Placement: Home with family assistance     Bennie Rizo LCSW

## 2021-09-14 NOTE — PROGRESS NOTES
[facility]  [address] [facility], [address] [place] [address]  [phone]      Medical Progress Note      NAME: Jacqueline Clark   :  2000  MRM:  [id number]    Date/Time of service: 2021         Subjective:     Chief Complaint:  Patient was personally seen and examined by me during this time period. Chart reviewed. F/u optic neuritis. Patient reports blurry and loss of vision. She also reports occasional nausea, bladder incontinence and paraesthesia. Objective:       Vitals:       Last 24hrs VS reviewed since prior progress note.  Most recent are:    Visit Vitals  /71 (BP 1 Location: Right upper arm, BP Patient Position: Sitting)   Pulse 89   Temp 98.2 °F (36.8 °C)   Resp 20   Ht 5' 7\" (1.702 m)   Wt 106.6 kg (235 lb)   SpO2 97%   BMI 36.81 kg/m²     SpO2 Readings from Last 6 Encounters:   21 97%   21 100%   19 98%            Intake/Output Summary (Last 24 hours) at 2021 1437  Last data filed at 2021 1410  Gross per 24 hour   Intake 320 ml   Output    Net 320 ml        Exam:     Physical Exam:    Gen:  Well-developed, well-nourished, in no acute distress  HEENT:  Pink conjunctivae, PERRL, hearing intact to voice, moist mucous membranes  Neck:  Supple, no tenderness to palpation   Resp:  No accessory muscle use, clear breath sounds without wheezes rales or rhonchi  Card:  RRR, No murmurs, normal S1, S2, no peripheral edema  Abd:  Soft, non-tender, non-distended, normoactive bowel sounds are present  Musc:  No cyanosis or clubbing  Skin:  No rashes or ulcers, skin turgor is good  Neuro:  Cranial nerves 3-12 are grossly intact, no focal weakness, follows commands appropriately  Psych:  Oriented to person, place, and time, Alert with good insight      Medications Reviewed: (see below)    Lab Data Reviewed: (see below)    ______________________________________________________________________    Medications:     Current Facility-Administered Medications   Medication Dose Route Frequency    [START ON 9/15/2021] methylPREDNISolone (Solu-MEDROL) 1 g in 100 mL NS MBP  1 g IntraVENous QAM    sodium chloride (NS) flush 5-40 mL  5-40 mL IntraVENous Q8H    sodium chloride (NS) flush 5-40 mL  5-40 mL IntraVENous PRN    acetaminophen (TYLENOL) tablet 650 mg  650 mg Oral Q6H PRN    Or    acetaminophen (TYLENOL) suppository 650 mg  650 mg Rectal Q6H PRN    polyethylene glycol (MIRALAX) packet 17 g  17 g Oral DAILY PRN    ondansetron (ZOFRAN ODT) tablet 4 mg  4 mg Oral Q8H PRN    Or    ondansetron (ZOFRAN) injection 4 mg  4 mg IntraVENous Q6H PRN    enoxaparin (LOVENOX) injection 40 mg  40 mg SubCUTAneous DAILY    methylPREDNISolone (PF) 250 mg in 0.9% sodium chloride 100 mL IVPB   IntraVENous Q6H    imipramine (TOFRANIL) tablet 25 mg  25 mg Oral QHS          Lab Review:     Recent Labs     09/13/21  1837   WBC 11.2*   HGB 11.6   HCT 36.6        Recent Labs     09/13/21  1837      K 5.0      CO2 26   GLU 98   BUN 10   CREA 0.64   CA 8.8   ALB 3.4*   TBILI 0.9   ALT 25     No results found for: GLUCPOC       Assessment / Plan:     Acute optic neuritis: based on nerve edema, MRI report OP 9/08/21. MRI C-spine and T-spine pending to evaluate for cord lesions. Lab work for demyelinating disease including CAROL, NMO antibodies, quant-TB, Anti-SSA and SSB pending. Continue IV steroids. Neurology following and considering LP pending MRI results.                  Nocturnal enuresis: Recently started imipramine OP.  F/u urology OP.        Total time spent with patient: 32 Minutes **I personally saw and examined the patient during this time period**                 Care Plan discussed with: Patient and Nursing Staff    Discussed:  Care Plan    Prophylaxis:  Lovenox    Disposition:  Home w/Family           ___________________________________________________    Attending Physician: Lori Wiggins DO

## 2021-09-14 NOTE — PROGRESS NOTES
Pt transferred from ED to 416 via w/c with this author and patient's mother accompanying. In bed, HOB up, bed low position, bed brakes on. HOB siderails up x2, RLE siderail up x1. Allergies: NKA reviewed and med indications reviewed before meds given; patient stated understanding, affirmed NKA. Denied having pain, dizziness, nausea, or trouble breathing. Stated last BM 9/12/2021, and that patient has no trouble urinating. R wrist name bracelet; yellow fall risk bracelet placed with neuro patient; stated understanding. Neuro assessment wnl. This author successful attempt to collect gold color top tube blood draw this morning; but unsuccessfull obtaining bld for kit; bld stop flowing. Unsuccessful similarly by charge nurse Brayan Mehta, RN to obtain kit blood. Day shift RN notified.

## 2021-09-14 NOTE — CONSULTS
Neurology Consult - Inpatient      Name:   Justin Mendez  MRN:    [id number]    Date of Admission:  9/13/2021    Date of Consultation:  09/14/21       HISTORY OF PRESENT ILLNESS:     This is a 23 y.o. female with Overactive Bladder, Chronic mental illness. Neurology is asked to see patient for optic neuritis. Pt reports left eye pain with eye movement x 11 days, 7 day hx of vision difficulty with left eye. She saw Ophthalmology [facility]) 7 days ago and reports she was told she has optic neuritis in left eye. They ordered MRI Brain/ Orbits (completed on 9-9--21). I reviewed those images and report. Brain MRI at that time showed: no abnormalities of brain parenchyma. Neuro-Radiologist notes: Along the lateral aspect of the intraorbital left optic nerve, there is a small area of STIR hyperintensity (series 10 image 12) with associated abnormal enhancement (series 14 image 12 and series 13 image 6). The remainder of the optic nerve is otherwise normal in size and signal. The right optic nerve is normal. The globes are normal. The extraocular muscles are normal.     Pt/ mother report she was then seen by Neuro-Ophthalmologist at [facility] yesterday who confirmed that she has left optic neuritis and recommended she come to ER to receive IV steroids. Other than visual symptoms, pt reports having recent onset tingling/ numbness/ paresthesias in any part of body. Denies any extremity weakness or gait difficulty. Dr Barney Ramirez started pt on Methylprednisone 250 mg IV q6 hours last night at time of admission. Labs on 9-8-21: normal CBC, normal CMP other than trace elevated glucose. CRP elevated 0.94, ESR elevated 57    Labs on 9-13-21: trace elevated WBC 11.2 otherwise normal CBC, CMP normal except mild elevated total protein 8.7/ albumin mild low 3.4,  ER 54, CR 1.38. Pending labs: TB-quantiferon, Aquaporin-4 receptor antibodies      Reviewed EMR for any previous neurology records.   Pt was seen in Dec 2019 by Dr Juan [facility] for migraine and vertigo. Brain MRI done on 12-31-21 was normal. No subsequent visit with Dr Tavares Martínez, but pt's mother called her office on 9-9-21 reporting pt to have possible MS symptoms (not specified in note). Dr Tavares Martínez arranged for a follow up visit 1 week later (tomorrow).        Complete Review of Systems: reviewed on admission H&P    ====================================     No Known Allergies    Current Outpatient Medications   Medication Instructions    imipramine (TOFRANIL) 25 mg, Oral, EVERY BEDTIME, Last given by ER RN at 235 9/13/2021     oxybutynin chloride XL (DITROPAN XL) 10 mg, DAILY       Current Facility-Administered Medications   Medication Dose Route Frequency Provider Last Rate Last Admin    [START ON 9/16/2021] methylPREDNISolone (PF) (Solu-MEDROL) injection 1,000 mg  1,000 mg IntraVENous QAM Gaetano Huff MD        sodium chloride (NS) flush 5-40 mL  5-40 mL IntraVENous Q8H Giulia Hernandez MD   10 mL at 09/14/21 0622    sodium chloride (NS) flush 5-40 mL  5-40 mL IntraVENous PRN Giulia Hernandez MD        acetaminophen (TYLENOL) tablet 650 mg  650 mg Oral Q6H PRN Giulia Hernandez MD        Or   Paige Sanchez acetaminophen (TYLENOL) suppository 650 mg  650 mg Rectal Q6H PRN Giulia Hernandez MD        polyethylene glycol (MIRALAX) packet 17 g  17 g Oral DAILY PRN Giulia Hernandez MD        ondansetron (ZOFRAN ODT) tablet 4 mg  4 mg Oral Q8H PRN Giulia Hernandez MD        Or    ondansetron [facility]) injection 4 mg  4 mg IntraVENous Q6H PRN Giulia Hernandez MD        enoxaparin (LOVENOX) injection 40 mg  40 mg SubCUTAneous DAILY Giulia Hernandez MD   40 mg at 09/14/21 0941    methylPREDNISolone (PF) 250 mg in 0.9% sodium chloride 100 mL IVPB   IntraVENous Q6H Gaetano Huff  mL/hr at 09/14/21 0620 New Bag at 09/14/21 0620    imipramine (TOFRANIL) tablet 25 mg  25 mg Oral QHS Giulia Hernandez MD   25 mg at 09/13/21 7642 PSHx  has no past surgical history on file. SocHx  reports that she has never smoked. She has never used smokeless tobacco. She reports that she does not drink alcohol. FHx family history includes Cancer in her maternal grandfather; Heart Disease in her maternal grandmother; Neuropathy in her maternal grandmother. PHYSICAL EXAM    Patient Vitals for the past 4 hrs:   Temp Pulse Resp BP SpO2   09/14/21 1152 98.2 °F (36.8 °C) 89 20 119/71 97 %      General: Head: atraumatic. Eyes: conjunctiva clear. Neck: supple. Lungs: not examined. CV: not examined. Extremities: no edema. Skin: No rashes    Neurologic Exam:  Speech/ Language: no dysarthria, no aphasia. Alertness: oriented x 3.  CNs: Smell: not tested. Visual Fields (II): full to confrontation. Pupils (II): equal, round, reactive to light; no MACK. No afferent pupillary defect. Funduscopic: not examined. Extraocular movements (III, IV, VI): conjugate in all directions, no MACK. Ptosis (III, VII): none. Facial Sensation (V): intact LT and pinprick V1-2-3. Facial Movements (VII): symmetric at rest and on activation. Hearing (VIII): normal.  Soft palate elevation (IX): symmetric, no droop. Shoulder shrug (XI): symmetric, strong. Tongue protrusion (XII): midline    Motor:  5/5 in all exts. Sensory: intact LT, prick in all extremities. No spinal sensory level on either side. Cerebellar: no resting or postural tremors. Normal heel-shin bilateral.  Normal FNF on right. Cannot test FNF on left due to IV in [facility] fossa/ slightly painful for patient. Normal rapid finger tapping on both hands. Deep Tendon Reflexes: 1+ biceps, 1+ left patellar, 2+ right patellar. Plantar response: not tested. Gait: normal.  Romberg: negative. Labs/ Radiology     No results found. Assessment/ Plan       ICD-10-CM ICD-9-CM    1.  Left optic neuritis  H46.9 377.30 methylPREDNISolone (PF) 250 mg in 0.9% sodium chloride 100 mL IVPB      MRI CERV SPINE W WO CONT      MRI [facility] SPINE W WO CONT      MRI CERV SPINE W WO CONT      MRI [facility] SPINE W WO CONT      methylPREDNISolone (PF) (Solu-MEDROL) injection 1,000 mg      ANGIOTENSIN CONVERTING ENZYME      CAROL, DIRECT, W/REFLEX      VITAMIN B12      VITAMIN E      VITAMIN D, 25 HYDROXY      COPPER      SJOGREN'S ANTI-SS-A      SJOGREN'S ANTI-SS-B      T4, FREE      TSH 3RD GENERATION      ANGIOTENSIN CONVERTING ENZYME      CAROL, DIRECT, W/REFLEX      VITAMIN B12      VITAMIN E      VITAMIN D, 25 HYDROXY      COPPER      SJOGREN'S ANTI-SS-A      SJOGREN'S ANTI-SS-B      T4, FREE      TSH 3RD GENERATION   2. Left eye pain  H57.12 379.91    3. Optic neuritis  H46.9 377.30    4.  Demyelinating lesion (HCC)  G37.9 341.9 methylPREDNISolone (PF) 250 mg in 0.9% sodium chloride 100 mL IVPB      MRI CERV SPINE W WO CONT      MRI [facility] SPINE W WO CONT      MRI CERV SPINE W WO CONT      MRI [facility] SPINE W WO CONT      methylPREDNISolone (PF) (Solu-MEDROL) injection 1,000 mg      ANGIOTENSIN CONVERTING ENZYME      CAROL, DIRECT, W/REFLEX      VITAMIN B12      VITAMIN E      VITAMIN D, 25 HYDROXY      COPPER      SJOGREN'S ANTI-SS-A      SJOGREN'S ANTI-SS-B      T4, FREE      TSH 3RD GENERATION      ANGIOTENSIN CONVERTING ENZYME      CAROL, DIRECT, W/REFLEX      VITAMIN B12      VITAMIN E      VITAMIN D, 25 HYDROXY      COPPER      SJOGREN'S ANTI-SS-A      SJOGREN'S ANTI-SS-B      T4, FREE      TSH 3RD GENERATION     23 y.o. female with new-onset left optic neuritis (starting around 9-3-21)  Symptoms include left eye pain on movement and reduced vision in left eye  Confirmed on Brain / Orbit MRI and by Neuro-Ophthalmologist    Will change Solumedrol to 1000 mg IV qday for 9-15 and 9-16 AM    Ordered MRI C-spine and T-spine +/- contrast to look for any spinal cord lesions    Agree with Dr David Saeed on checking NMO antibodies (result pending)    Added additional labs to look for other causes of demyelinating disease    Will wait to see MRI C-spine/ T-spine results before deciding on doing lumbar puncture      Will follow with you    Thank you for asking the Neurology Service to evaluate [facility].       Signed By: Demario Sanchez MD     September 14, 2021

## 2021-09-15 ENCOUNTER — APPOINTMENT (OUTPATIENT)
Dept: INTERVENTIONAL RADIOLOGY/VASCULAR | Age: 21
DRG: 123 | End: 2021-09-15
Attending: INTERNAL MEDICINE
Payer: COMMERCIAL

## 2021-09-15 ENCOUNTER — APPOINTMENT (OUTPATIENT)
Dept: GENERAL RADIOLOGY | Age: 21
DRG: 123 | End: 2021-09-15
Attending: STUDENT IN AN ORGANIZED HEALTH CARE EDUCATION/TRAINING PROGRAM
Payer: COMMERCIAL

## 2021-09-15 ENCOUNTER — APPOINTMENT (OUTPATIENT)
Dept: GENERAL RADIOLOGY | Age: 21
DRG: 123 | End: 2021-09-15
Attending: PSYCHIATRY & NEUROLOGY
Payer: COMMERCIAL

## 2021-09-15 ENCOUNTER — APPOINTMENT (OUTPATIENT)
Dept: GENERAL RADIOLOGY | Age: 21
DRG: 123 | End: 2021-09-15
Attending: INTERNAL MEDICINE
Payer: COMMERCIAL

## 2021-09-15 LAB
ALBUMIN SERPL-MCNC: 3.1 G/DL (ref 3.5–5)
ALBUMIN/GLOB SERPL: 0.7 {RATIO} (ref 1.1–2.2)
ALP SERPL-CCNC: 77 U/L (ref 45–117)
ALT SERPL-CCNC: 23 U/L (ref 12–78)
ANION GAP SERPL CALC-SCNC: 4 MMOL/L (ref 5–15)
AQP4 H2O CHANNEL AB SERPL IA-ACNC: <1.5 U/ML (ref 0–3)
AST SERPL-CCNC: 8 U/L (ref 15–37)
BASOPHILS # BLD: 0 K/UL (ref 0–0.1)
BASOPHILS NFR BLD: 0 % (ref 0–1)
BILIRUB SERPL-MCNC: 0.3 MG/DL (ref 0.2–1)
BUN SERPL-MCNC: 13 MG/DL (ref 6–20)
BUN/CREAT SERPL: 19 (ref 12–20)
CALCIUM SERPL-MCNC: 8.3 MG/DL (ref 8.5–10.1)
CHLORIDE SERPL-SCNC: 109 MMOL/L (ref 97–108)
CO2 SERPL-SCNC: 26 MMOL/L (ref 21–32)
COMMENT, HOLDF: NORMAL
CREAT SERPL-MCNC: 0.69 MG/DL (ref 0.55–1.02)
DIFFERENTIAL METHOD BLD: ABNORMAL
EOSINOPHIL # BLD: 0 K/UL (ref 0–0.4)
EOSINOPHIL NFR BLD: 0 % (ref 0–7)
ERYTHROCYTE [DISTWIDTH] IN BLOOD BY AUTOMATED COUNT: 13 % (ref 11.5–14.5)
GLOBULIN SER CALC-MCNC: 4.7 G/DL (ref 2–4)
GLUCOSE SERPL-MCNC: 97 MG/DL (ref 65–100)
HCT VFR BLD AUTO: 35.1 % (ref 35–47)
HGB BLD-MCNC: 11.3 G/DL (ref 11.5–16)
IMM GRANULOCYTES # BLD AUTO: 0.2 K/UL (ref 0–0.04)
IMM GRANULOCYTES NFR BLD AUTO: 1 % (ref 0–0.5)
LYMPHOCYTES # BLD: 3 K/UL (ref 0.8–3.5)
LYMPHOCYTES NFR BLD: 13 % (ref 12–49)
MCH RBC QN AUTO: 27.5 PG (ref 26–34)
MCHC RBC AUTO-ENTMCNC: 32.2 G/DL (ref 30–36.5)
MCV RBC AUTO: 85.4 FL (ref 80–99)
MONOCYTES # BLD: 1.6 K/UL (ref 0–1)
MONOCYTES NFR BLD: 7 % (ref 5–13)
NEUTS SEG # BLD: 18.9 K/UL (ref 1.8–8)
NEUTS SEG NFR BLD: 79 % (ref 32–75)
NRBC # BLD: 0 K/UL (ref 0–0.01)
NRBC BLD-RTO: 0 PER 100 WBC
PLATELET # BLD AUTO: 362 K/UL (ref 150–400)
PMV BLD AUTO: 9.5 FL (ref 8.9–12.9)
POTASSIUM SERPL-SCNC: 3.8 MMOL/L (ref 3.5–5.1)
PROT SERPL-MCNC: 7.8 G/DL (ref 6.4–8.2)
RBC # BLD AUTO: 4.11 M/UL (ref 3.8–5.2)
SAMPLES BEING HELD,HOLD: NORMAL
SODIUM SERPL-SCNC: 139 MMOL/L (ref 136–145)
T4 FREE SERPL-MCNC: 0.9 NG/DL (ref 0.8–1.5)
TSH SERPL DL<=0.05 MIU/L-ACNC: 0.97 UIU/ML (ref 0.36–3.74)
VIT B12 SERPL-MCNC: 451 PG/ML (ref 193–986)
WBC # BLD AUTO: 23.7 K/UL (ref 3.6–11)

## 2021-09-15 PROCEDURE — 05HY33Z INSERTION OF INFUSION DEVICE INTO UPPER VEIN, PERCUTANEOUS APPROACH: ICD-10-PCS | Performed by: STUDENT IN AN ORGANIZED HEALTH CARE EDUCATION/TRAINING PROGRAM

## 2021-09-15 PROCEDURE — 74011250637 HC RX REV CODE- 250/637: Performed by: INTERNAL MEDICINE

## 2021-09-15 PROCEDURE — 86480 TB TEST CELL IMMUN MEASURE: CPT

## 2021-09-15 PROCEDURE — 71045 X-RAY EXAM CHEST 1 VIEW: CPT

## 2021-09-15 PROCEDURE — 74018 RADEX ABDOMEN 1 VIEW: CPT

## 2021-09-15 PROCEDURE — 86376 MICROSOMAL ANTIBODY EACH: CPT

## 2021-09-15 PROCEDURE — 65270000029 HC RM PRIVATE

## 2021-09-15 PROCEDURE — 82164 ANGIOTENSIN I ENZYME TEST: CPT

## 2021-09-15 PROCEDURE — 74011250636 HC RX REV CODE- 250/636: Performed by: INTERNAL MEDICINE

## 2021-09-15 PROCEDURE — 86431 RHEUMATOID FACTOR QUANT: CPT

## 2021-09-15 PROCEDURE — 86256 FLUORESCENT ANTIBODY TITER: CPT

## 2021-09-15 PROCEDURE — 36415 COLL VENOUS BLD VENIPUNCTURE: CPT

## 2021-09-15 PROCEDURE — 84443 ASSAY THYROID STIM HORMONE: CPT

## 2021-09-15 PROCEDURE — 02HV33Z INSERTION OF INFUSION DEVICE INTO SUPERIOR VENA CAVA, PERCUTANEOUS APPROACH: ICD-10-PCS | Performed by: STUDENT IN AN ORGANIZED HEALTH CARE EDUCATION/TRAINING PROGRAM

## 2021-09-15 PROCEDURE — 36556 INSERT NON-TUNNEL CV CATH: CPT

## 2021-09-15 PROCEDURE — 86780 TREPONEMA PALLIDUM: CPT

## 2021-09-15 PROCEDURE — C1752 CATH,HEMODIALYSIS,SHORT-TERM: HCPCS

## 2021-09-15 PROCEDURE — 86235 NUCLEAR ANTIGEN ANTIBODY: CPT

## 2021-09-15 PROCEDURE — 80053 COMPREHEN METABOLIC PANEL: CPT

## 2021-09-15 PROCEDURE — 82306 VITAMIN D 25 HYDROXY: CPT

## 2021-09-15 PROCEDURE — 86618 LYME DISEASE ANTIBODY: CPT

## 2021-09-15 PROCEDURE — 82525 ASSAY OF COPPER: CPT

## 2021-09-15 PROCEDURE — 84446 ASSAY OF VITAMIN E: CPT

## 2021-09-15 PROCEDURE — C1751 CATH, INF, PER/CENT/MIDLINE: HCPCS

## 2021-09-15 PROCEDURE — 84439 ASSAY OF FREE THYROXINE: CPT

## 2021-09-15 PROCEDURE — 2709999900 HC NON-CHARGEABLE SUPPLY

## 2021-09-15 PROCEDURE — 82607 VITAMIN B-12: CPT

## 2021-09-15 PROCEDURE — 86038 ANTINUCLEAR ANTIBODIES: CPT

## 2021-09-15 PROCEDURE — 87389 HIV-1 AG W/HIV-1&-2 AB AG IA: CPT

## 2021-09-15 PROCEDURE — C1894 INTRO/SHEATH, NON-LASER: HCPCS

## 2021-09-15 PROCEDURE — 85025 COMPLETE CBC W/AUTO DIFF WBC: CPT

## 2021-09-15 RX ORDER — HYDROMORPHONE HYDROCHLORIDE 1 MG/ML
0.5 INJECTION, SOLUTION INTRAMUSCULAR; INTRAVENOUS; SUBCUTANEOUS ONCE
Status: COMPLETED | OUTPATIENT
Start: 2021-09-15 | End: 2021-09-15

## 2021-09-15 RX ORDER — LIDOCAINE HYDROCHLORIDE 10 MG/ML
10-30 INJECTION INFILTRATION; PERINEURAL
Status: CANCELLED | OUTPATIENT
Start: 2021-09-15

## 2021-09-15 RX ORDER — MORPHINE SULFATE 2 MG/ML
2 INJECTION, SOLUTION INTRAMUSCULAR; INTRAVENOUS ONCE
Status: COMPLETED | OUTPATIENT
Start: 2021-09-15 | End: 2021-09-15

## 2021-09-15 RX ORDER — HEPARIN SODIUM 1000 [USP'U]/ML
1000-5000 INJECTION, SOLUTION INTRAVENOUS; SUBCUTANEOUS
Status: CANCELLED | OUTPATIENT
Start: 2021-09-15

## 2021-09-15 RX ADMIN — MORPHINE SULFATE 2 MG: 2 INJECTION, SOLUTION INTRAMUSCULAR; INTRAVENOUS at 20:40

## 2021-09-15 RX ADMIN — ACETAMINOPHEN 650 MG: 325 TABLET ORAL at 15:25

## 2021-09-15 RX ADMIN — Medication 10 ML: at 22:47

## 2021-09-15 RX ADMIN — HYDROMORPHONE HYDROCHLORIDE 0.5 MG: 1 INJECTION, SOLUTION INTRAMUSCULAR; INTRAVENOUS; SUBCUTANEOUS at 17:17

## 2021-09-15 RX ADMIN — IMIPRAMINE HYDROCHLORIDE 25 MG: 25 TABLET ORAL at 22:47

## 2021-09-15 RX ADMIN — MORPHINE SULFATE 2 MG: 2 INJECTION, SOLUTION INTRAMUSCULAR; INTRAVENOUS at 22:51

## 2021-09-15 NOTE — PROGRESS NOTES
Physical Therapy Note:  Chart reviewed and PT consult acknowledged. RN stating pt receiving mid-line at this time. Will continue to follow.   Janie Peters, PT

## 2021-09-15 NOTE — CONSULTS
Asked to see by neurology to provide PLEX for NMO  Spoke with pt and obtained consent. Awaiting access.  Pheresis to start in am.

## 2021-09-15 NOTE — PROGRESS NOTES
Neurology - Inpatient Progress Note     Name:   Carlos Enrique Valenzuela  MRN:    [id number]  [address] [facility] Date:   9/13/2021    Follow up:   Left optic neuritis, spinal cord lesions, no Brain lesions    Interval History     Reviewed MRI Cervical and Thoracic spine images, discussed with Radiology/ Dr Gloria Shirley. Multiple spinal cord lesions (spanning 2 segments in C-spine, then patchy throughout T-spine)  D/w Dr Lopez/Radiology whether pattern is suggestive of NMO  He feels NMO is in the differential diagnosis but can't say that the imaging pattern is specific for that    Blood work hasn't been done yet as patient has not had any reliable peripheral IV access despite multiple attempts by RN. IV access Nurse has been trying this AM to get peripheral IV access with ultrasound, no success yet/ as of this note. Discussed with patient/ mother that Imaging pattern is suggestive of NMO, not transverse myelitis (as there is evidence of optic nerve demyelination), not multiple sclerosis (as there is no evidence of brain parenchymal lesions). D/w them that NMO is the most likely diagnosis at this point, but waiting on multiple lab results (including NMO antibody)  to look for other causes. D/w them that will need to extend IV steroids for total of 5 days (1000 mg/ day x 5 days) and that the acute treatment for NMO is Plasma Exchange/ Plasma Pheresis (explained what that was) every other day x 5 sessions. D/w them that because NMO is the highest on differential at this point, I recommend we pursue the Plasma Exchange, and that we do LP (once peripheral IV access is obtained as MS panel requires a blood sample as well), and after discharge she should should see a Neurologist who specializes in Demyelinating disease (\"MS specialist\") for to review all labs/ testing done in hospital and provide the final diagnosis. She/ mother expressed understanding and agreement with this plan.      Exam: awake, resting in bed, calm, IV access nurse trying to get IV placed. EOMI, face appears symmetric, normal hearing/ normal speech      Brief ROS: As noted above         No Known Allergies    Current Facility-Administered Medications:     [START ON 9/16/2021] methylPREDNISolone (Solu-MEDROL) 1 g in 100 mL NS MBP, 1 g, IntraVENous, Prieto DESAI Mudassar, MD    sodium chloride (NS) flush 5-40 mL, 5-40 mL, IntraVENous, Q8H, Blayne Gibson MD, 10 mL at 09/14/21 2159    sodium chloride (NS) flush 5-40 mL, 5-40 mL, IntraVENous, PRN, Blayne Gibson MD    acetaminophen (TYLENOL) tablet 650 mg, 650 mg, Oral, Q6H PRN **OR** acetaminophen (TYLENOL) suppository 650 mg, 650 mg, Rectal, Q6H PRN, Blayne Gibson MD    polyethylene glycol (MIRALAX) packet 17 g, 17 g, Oral, DAILY PRN, Blayne Gibson MD    ondansetron (ZOFRAN ODT) tablet 4 mg, 4 mg, Oral, Q8H PRN **OR** ondansetron (ZOFRAN) injection 4 mg, 4 mg, IntraVENous, Q6H PRN, Blayne Gibson MD    enoxaparin (LOVENOX) injection 40 mg, 40 mg, SubCUTAneous, DAILY, Blayne Gibson MD, 40 mg at 09/14/21 0941    imipramine (TOFRANIL) tablet 25 mg, 25 mg, Oral, QHS, Blayne Gibson MD, 25 mg at 09/14/21 2159      PMHx: has a past medical history of Chronic mental illness and Overactive bladder. PSHx: has no past surgical history on file. Impression / Plan       ICD-10-CM ICD-9-CM   1. Left optic neuritis  H46.9 377.30   2. Left eye pain  H57.12 379.91   3. Optic neuritis  H46.9 377.30   4. Demyelinating lesion (HCC)  G37.9 341.9     Added to labwork: anti-MOG antibodies, Lyme antibodies, FTA-Abs, HIV, paraneoplastic antibodies, RF, anti-phospholipid antibodies, thyroid peroxidase antibodies    Added CXR (to eval for sarcoid)    Awaiting IV access to get MS panel (when I do LP)  Anticipate LP at bedside tomorrow    Changed IV solumedrol to 1000 mg IV q day x 5 days total (pt has only received one day of high dose IV steroids; on 9-13/ 9-14). For Plasma Exchange, pt will need Hemodialysis catheter.   Because above needs to be done first, will consult them tomorrow after LP to get that access with plan to start Plasma Exchange on 9-17-21. Plasma exchange will be 5-7 liters every other day, x 5 treatment sessions.      Will continue following with you      Spent greater than 75 minutes discussing with RN, Hospitalist, Nephrologist, IV access Nurse, Mother, Patient; adding new orders, complex treatment planning        Signed By: Keith Shrestha MD     September 15, 2021

## 2021-09-15 NOTE — PROGRESS NOTES
TRANSFER - OUT REPORT:    Verbal report given segundo on Christa Adel Goodell being transferred to416 for ordered procedure       Report consisted of patient's Situation, Background, Assessment and   Recommendations(SBAR). Information from the following report(s) Procedure Summary was reviewed with the receiving nurse. Opportunity for questions and clarification was provided.       Patient transported with:   Registered Nurse

## 2021-09-15 NOTE — PROGRESS NOTES
[facility]  [address]  [phone]      Medical Progress Note      NAME: Xiao Chaudhary   :  2000  MRM:  [id number]    Date/Time of service: 9/15/2021         Subjective:     Chief Complaint:  Patient was personally seen and examined by me during this time period. Chart reviewed. F/u optic neuritis. Needs to report changes in vision and occasional nausea, some constipation. Objective:       Vitals:       Last 24hrs VS reviewed since prior progress note.  Most recent are:    Visit Vitals  /67 (BP 1 Location: Left arm, BP Patient Position: Sitting)   Pulse 72   Temp 98.1 °F (36.7 °C)   Resp 16   Ht 5' 7\" (1.702 m)   Wt 106.6 kg (235 lb)   SpO2 97%   BMI 36.81 kg/m²     SpO2 Readings from Last 6 Encounters:   09/15/21 97%   21 100%   19 98%          No intake or output data in the 24 hours ending 09/15/21 2146     Exam:     Physical Exam:    Gen:  Well-developed, well-nourished, in no acute distress  HEENT:  Pink conjunctivae, PERRL, hearing intact to voice, moist mucous membranes  Neck:  Supple, no tenderness to palpation   Resp:  No accessory muscle use, clear breath sounds without wheezes rales or rhonchi  Card:  RRR, No murmurs, normal S1, S2, no peripheral edema  Abd:  Soft, non-tender, non-distended, normoactive bowel sounds are present  Musc:  No cyanosis or clubbing  Skin:  No rashes or ulcers, skin turgor is good  Neuro:  Cranial nerves 3-12 are grossly intact, no focal weakness, follows commands appropriately  Psych:  Oriented to person, place, and time, Alert with good insight      Medications Reviewed: (see below)    Lab Data Reviewed: (see below)    ______________________________________________________________________    Medications:     Current Facility-Administered Medications   Medication Dose Route Frequency    [START ON 2021] methylPREDNISolone (Solu-MEDROL) 1 g in 100 mL NS MBP  1 g IntraVENous QAM    sodium chloride (NS) flush 5-40 mL  5-40 mL IntraVENous Q8H    sodium chloride (NS) flush 5-40 mL  5-40 mL IntraVENous PRN    acetaminophen (TYLENOL) tablet 650 mg  650 mg Oral Q6H PRN    Or    acetaminophen (TYLENOL) suppository 650 mg  650 mg Rectal Q6H PRN    polyethylene glycol (MIRALAX) packet 17 g  17 g Oral DAILY PRN    ondansetron (ZOFRAN ODT) tablet 4 mg  4 mg Oral Q8H PRN    Or    ondansetron (ZOFRAN) injection 4 mg  4 mg IntraVENous Q6H PRN    enoxaparin (LOVENOX) injection 40 mg  40 mg SubCUTAneous DAILY    imipramine (TOFRANIL) tablet 25 mg  25 mg Oral QHS          Lab Review:     Recent Labs     09/13/21  1837   WBC 11.2*   HGB 11.6   HCT 36.6        Recent Labs     09/13/21  1837      K 5.0      CO2 26   GLU 98   BUN 10   CREA 0.64   CA 8.8   ALB 3.4*   TBILI 0.9   ALT 25     No results found for: GLUCPOC       Assessment / Plan:     Acute optic neuritis/ Vision loss/ Demyelinating lesions: optic neuritis based on nerve edema, MRI report OP 9/08/21. MRI C-spine and T-spine show cord lesions. Concern for NMO. Chest x-ray unremarkable. Lab work pending including CAROL, NMO antibodies, quant-TB, Anti-SSA and SSB, Lyme, paraneoplastic antibodies, RF, antiphospholipid antibodies pending. Continue IV steroids. Plan for plasmapheresis. Will consult IR for dialysis catheter and central line placement. Neurology following and plan for possible LP tomorrow.                  Nocturnal enuresis: Recently started imipramine OP. F/u urology OP.      Nausea/ Constipation: Zofran as needed. Obtain KUB. Patient declines bowel regimen at this time.     Total time spent with patient: 32 Minutes **I personally saw and examined the patient during this time period**                 Care Plan discussed with: Patient and Nursing Staff    Discussed:  Care Plan    Prophylaxis:  Lovenox    Disposition:  Home w/Family           ___________________________________________________    Attending Physician: Rosina Rick DO

## 2021-09-15 NOTE — PROGRESS NOTES
1440  Hemodialysis access placed, pt tolerated well     1507  Right Triple lumen Internal Jugular placed pt tolerated well     1715 Labs drawn and one time of dilaudid given for pain from procedure,          effective results.      1800 Taken to radiology for KUB

## 2021-09-15 NOTE — PROGRESS NOTES
TRANSFER - OUT REPORT:    Verbal report given to nurse on [address] being transferred to [address] [place] [address] for ordered procedure       Report consisted of patient's Situation, Background, Assessment and   Recommendations(SBAR). Information from the following report(s) Procedure Summary was reviewed with the receiving nurse. Opportunity for questions and clarification was provided.       Patient transported with:   Registered Nurse

## 2021-09-15 NOTE — PROCEDURES
Interventional Radiology  Procedure Note        9/15/2021 5:08 PM    Patient: Nell Johnston     Informed consent obtained    Diagnosis: Optic neuritis    Procedure(s): Right triple lumen central venous catheter; right IJ nontunneled hemodialysis catheter    Specimens removed:  none    Complications: None    Primary Physician: Salvador Gray MD    Recommendations: ok to use both catheters    Discharge Disposition: remain in room    Full dictated report to follow    Salvador Gray MD  Interventional Radiology  [facility]  5:08 PM, 9/15/2021

## 2021-09-15 NOTE — CONSULTS
[address]     Name:  Matt Fraser  MR#:  [id number]  :  2000  ACCOUNT #:  [de-identified]  DATE OF SERVICE:  09/15/2021      NEPHROLOGY CONSULTATION    REQUESTING PHYSICIAN:  Mckay Evans DO    CHIEF COMPLAINT:  Request for plasmapheresis. HISTORY OF PRESENT ILLNESS:  The patient is a 28-year-old white female, who has a history of overactive bladder and knee problems and has had eye pain and vision changes for the past few weeks. She has undergone imaging and is felt to have features consistent with neuromyelitis optica per Neurology and we have been requested to provide plasmapheresis. REVIEW OF SYSTEMS:  CONSTITUTIONAL:  No fevers or chills. GI:  No nausea or vomiting. CARDIOVASCULAR:  No chest pain or shortness of breath. NEUROLOGIC:  Positive for eye pain and vision changes, particularly left eye. No lower extremity weakness. All other systems reviewed and are negative except as per history of present illness. PAST MEDICAL HISTORY:  Overactive bladder, knee pain, history of dizziness, and vertigo. FAMILY HISTORY:  No family history of neurological disorders. SOCIAL HISTORY:  She does not drink or smoke. PHYSICAL EXAMINATION:  VITAL SIGNS:  Temperature 98.8, pulse 91, blood pressure 108/61. GENERAL:  Pleasant white female, in no acute distress. EYES:  Eyes are anicteric. Extraocular movements are intact. ENMT:  Mucous membranes are moist.  There is no epistaxis. NECK:  Nontender. There is no mass. HEART:  Regular. There is no lower extremity edema. RESPIRATORY:  Lungs are clear with good effort. GI:  Abdomen is soft, nontender. Bowel sounds are active. There is no guarding or rebound. SKIN:  Warm with normal turgor. There is no rash. MUSCULOSKELETAL:  There is no cyanosis or clubbing. There is no synovitis in fingers or wrists bilaterally.     LABORATORY DATA:  Sodium 136, potassium 5.0, chloride 107, bicarb 26, BUN 10, creatinine 0.64. White count is 11.2, hemoglobin 11.6, platelets 475. RADIOGRAPHIC STUDIES:  MRI of the cervical spine shows multifocal areas of cord single abnormality compatible with demyelinating lesions. ASSESSMENT:  We are asked to provide plasmapheresis for presumed optic neuritis by Neurology. PLAN:  1. I discussed plasma exchange with the patient. Discussed the risks with emphasis on bleeding and infection. I explained the rationale for pheresis. She gives consent to proceed with plasmapheresis. 2.  We will obtain catheter placement today and begin plasmapheresis in the morning. We will plan to do five exchanges of one plasma volume and replace with albumin. We will plan for treatments every other day. 3.  We will follow with you to assist in her management during this hospitalization.       Doris Urena MD      [id number]  D:  09/15/2021 12:08  T:  09/15/2021 14:14  JOB #:  [id number]

## 2021-09-15 NOTE — PROGRESS NOTES
Physical Therapy Note:  Pt received up ad julissa in room. RN stating pt walking halls as well independently. Pt and family declining need for PT eval.   Based on observation and request PT orders completed.   Coby Frey, PT

## 2021-09-15 NOTE — PROGRESS NOTES
9/15/2021  3:58 PM  RUR: 7%   Risk Level: [x]Low []Moderate []High  Value-based purchasing: [] Yes [x] No  Bundle patient: [] Yes [x] No   Specify:     Transition of care plan:  1. Awaiting medical clearance and DC order. Neurology and nephrology following. Pheresis planned for tomorrow. 2. Home with family assistance as needed. 3. Outpatient follow-up. 4. Pt's family to transport.

## 2021-09-16 ENCOUNTER — APPOINTMENT (OUTPATIENT)
Dept: GENERAL RADIOLOGY | Age: 21
DRG: 123 | End: 2021-09-16
Attending: INTERNAL MEDICINE
Payer: COMMERCIAL

## 2021-09-16 DIAGNOSIS — G95.9 SPINAL CORD LESION (HCC): ICD-10-CM

## 2021-09-16 DIAGNOSIS — G37.9 DEMYELINATING DISEASE OF THE SPINAL CORD (HCC): ICD-10-CM

## 2021-09-16 DIAGNOSIS — H46.9 OPTIC NEURITIS: Primary | ICD-10-CM

## 2021-09-16 LAB
25(OH)D3 SERPL-MCNC: 22.9 NG/ML (ref 30–100)
ALBUMIN SERPL-MCNC: 2.9 G/DL (ref 3.5–5)
ALBUMIN SERPL-MCNC: 2.9 G/DL (ref 3.5–5)
ALBUMIN/GLOB SERPL: 0.7 {RATIO} (ref 1.1–2.2)
ALBUMIN/GLOB SERPL: 0.7 {RATIO} (ref 1.1–2.2)
ALP SERPL-CCNC: 62 U/L (ref 45–117)
ALP SERPL-CCNC: 66 U/L (ref 45–117)
ALT SERPL-CCNC: 20 U/L (ref 12–78)
ALT SERPL-CCNC: 22 U/L (ref 12–78)
ANION GAP SERPL CALC-SCNC: 4 MMOL/L (ref 5–15)
ANION GAP SERPL CALC-SCNC: 4 MMOL/L (ref 5–15)
APPEARANCE CSF: CLEAR
APPEARANCE CSF: CLEAR
AST SERPL-CCNC: 15 U/L (ref 15–37)
AST SERPL-CCNC: 6 U/L (ref 15–37)
BASOPHILS # BLD: 0 K/UL (ref 0–0.1)
BASOPHILS NFR BLD: 0 % (ref 0–1)
BILIRUB SERPL-MCNC: 0.4 MG/DL (ref 0.2–1)
BILIRUB SERPL-MCNC: 0.5 MG/DL (ref 0.2–1)
BUN SERPL-MCNC: 14 MG/DL (ref 6–20)
BUN SERPL-MCNC: 15 MG/DL (ref 6–20)
BUN/CREAT SERPL: 21 (ref 12–20)
BUN/CREAT SERPL: 24 (ref 12–20)
CALCIUM SERPL-MCNC: 8 MG/DL (ref 8.5–10.1)
CALCIUM SERPL-MCNC: 8.1 MG/DL (ref 8.5–10.1)
CHLORIDE SERPL-SCNC: 108 MMOL/L (ref 97–108)
CHLORIDE SERPL-SCNC: 109 MMOL/L (ref 97–108)
CO2 SERPL-SCNC: 25 MMOL/L (ref 21–32)
CO2 SERPL-SCNC: 28 MMOL/L (ref 21–32)
COLOR CSF: COLORLESS
COLOR CSF: COLORLESS
CREAT SERPL-MCNC: 0.63 MG/DL (ref 0.55–1.02)
CREAT SERPL-MCNC: 0.67 MG/DL (ref 0.55–1.02)
CRYPTOC AG CSF QL IA: NEGATIVE
CRYPTOCOCCUS NEOFORMANS/GATTII, CRNEOG: NOT DETECTED
CYTOMEGALOVIRUS, CYMEG: NOT DETECTED
DIFFERENTIAL METHOD BLD: ABNORMAL
ENTEROVIRUS, ENTVIR: NOT DETECTED
EOSINOPHIL # BLD: 0 K/UL (ref 0–0.4)
EOSINOPHIL NFR BLD: 0 % (ref 0–7)
ERYTHROCYTE [DISTWIDTH] IN BLOOD BY AUTOMATED COUNT: 12.9 % (ref 11.5–14.5)
ESCHERICHIA COLI K1, ECK1: NOT DETECTED
GLOBULIN SER CALC-MCNC: 4 G/DL (ref 2–4)
GLOBULIN SER CALC-MCNC: 4.2 G/DL (ref 2–4)
GLUCOSE CSF-MCNC: 58 MG/DL (ref 40–70)
GLUCOSE SERPL-MCNC: 85 MG/DL (ref 65–100)
GLUCOSE SERPL-MCNC: 95 MG/DL (ref 65–100)
HAEMOPHILUS INFLUENZAE, HAEFLU: NOT DETECTED
HCT VFR BLD AUTO: 33.5 % (ref 35–47)
HERPES SIMPLEX VIRUS 2, HSIMV2: NOT DETECTED
HGB BLD-MCNC: 10.8 G/DL (ref 11.5–16)
HIV 1+2 AB+HIV1 P24 AG SERPL QL IA: NONREACTIVE
HIV12 RESULT COMMENT, HHIVC: NORMAL
HSV1 DNA CSF QL NAA+PROBE: NOT DETECTED
HUMAN HERPESVIRUS 6, HUHV6: NOT DETECTED
HUMAN PARECHOVIRUS, HUPARV: NOT DETECTED
IMM GRANULOCYTES # BLD AUTO: 0.1 K/UL (ref 0–0.04)
IMM GRANULOCYTES NFR BLD AUTO: 0 % (ref 0–0.5)
INDIA INK PREP CSF: NORMAL
LISTERIA MONOCYTOGENES, LISMON: NOT DETECTED
LYMPHOCYTES # BLD: 4.3 K/UL (ref 0.8–3.5)
LYMPHOCYTES NFR BLD: 28 % (ref 12–49)
LYMPHOCYTES NFR CSF MANUAL: 91 % (ref 28–96)
MAGNESIUM SERPL-MCNC: 2.2 MG/DL (ref 1.6–2.4)
MAGNESIUM SERPL-MCNC: 2.8 MG/DL (ref 1.6–2.4)
MCH RBC QN AUTO: 28.3 PG (ref 26–34)
MCHC RBC AUTO-ENTMCNC: 32.2 G/DL (ref 30–36.5)
MCV RBC AUTO: 87.9 FL (ref 80–99)
MONOCYTES # BLD: 1.3 K/UL (ref 0–1)
MONOCYTES NFR BLD: 8 % (ref 5–13)
MONOCYTES NFR CSF MANUAL: 8 % (ref 16–56)
NEISSERIA MENINGITIDIS, NEIMEN: NOT DETECTED
NEUTROPHILS NFR CSF MANUAL: 1 % (ref 0–7)
NEUTS SEG # BLD: 9.7 K/UL (ref 1.8–8)
NEUTS SEG NFR BLD: 64 % (ref 32–75)
NRBC # BLD: 0 K/UL (ref 0–0.01)
NRBC BLD-RTO: 0 PER 100 WBC
PLATELET # BLD AUTO: 305 K/UL (ref 150–400)
PMV BLD AUTO: 9.6 FL (ref 8.9–12.9)
POTASSIUM SERPL-SCNC: 3.8 MMOL/L (ref 3.5–5.1)
POTASSIUM SERPL-SCNC: 3.9 MMOL/L (ref 3.5–5.1)
PROT CSF-MCNC: 25 MG/DL (ref 15–45)
PROT SERPL-MCNC: 6.9 G/DL (ref 6.4–8.2)
PROT SERPL-MCNC: 7.1 G/DL (ref 6.4–8.2)
RBC # BLD AUTO: 3.81 M/UL (ref 3.8–5.2)
RBC # CSF: 0 /CU MM
RBC # CSF: 2 /CU MM
RHEUMATOID FACT SERPL-ACNC: <10 IU/ML
SERVICE CMNT-IMP: NORMAL
SODIUM SERPL-SCNC: 138 MMOL/L (ref 136–145)
SODIUM SERPL-SCNC: 140 MMOL/L (ref 136–145)
STREPTOCOCCUS AGALACTIAE, SAGA: NOT DETECTED
STREPTOCOCCUS PNEUMONIAE, STRPNE: NOT DETECTED
TUBE # CSF: 1
TUBE # CSF: 2
TUBE # CSF: 2
TUBE # CSF: 4
VARICELLA ZOSTER VIRUS, VAZOVI: NOT DETECTED
WBC # BLD AUTO: 15.4 K/UL (ref 3.6–11)
WBC # CSF: 2 /CU MM (ref 0–5)
WBC # CSF: 7 /CU MM (ref 0–5)

## 2021-09-16 PROCEDURE — 82945 GLUCOSE OTHER FLUID: CPT

## 2021-09-16 PROCEDURE — 65270000029 HC RM PRIVATE

## 2021-09-16 PROCEDURE — 74011250637 HC RX REV CODE- 250/637: Performed by: PSYCHIATRY & NEUROLOGY

## 2021-09-16 PROCEDURE — 87476 LYME DIS DNA AMP PROBE: CPT

## 2021-09-16 PROCEDURE — 74011250637 HC RX REV CODE- 250/637: Performed by: INTERNAL MEDICINE

## 2021-09-16 PROCEDURE — 84157 ASSAY OF PROTEIN OTHER: CPT

## 2021-09-16 PROCEDURE — 71045 X-RAY EXAM CHEST 1 VIEW: CPT

## 2021-09-16 PROCEDURE — 86255 FLUORESCENT ANTIBODY SCREEN: CPT

## 2021-09-16 PROCEDURE — 74011250636 HC RX REV CODE- 250/636: Performed by: INTERNAL MEDICINE

## 2021-09-16 PROCEDURE — 87205 SMEAR GRAM STAIN: CPT

## 2021-09-16 PROCEDURE — 74011250636 HC RX REV CODE- 250/636: Performed by: PSYCHIATRY & NEUROLOGY

## 2021-09-16 PROCEDURE — 87327 CRYPTOCOCCUS NEOFORM AG IA: CPT

## 2021-09-16 PROCEDURE — 87210 SMEAR WET MOUNT SALINE/INK: CPT

## 2021-09-16 PROCEDURE — 89050 BODY FLUID CELL COUNT: CPT

## 2021-09-16 PROCEDURE — 36415 COLL VENOUS BLD VENIPUNCTURE: CPT

## 2021-09-16 PROCEDURE — 82040 ASSAY OF SERUM ALBUMIN: CPT

## 2021-09-16 PROCEDURE — 83735 ASSAY OF MAGNESIUM: CPT

## 2021-09-16 PROCEDURE — 80053 COMPREHEN METABOLIC PANEL: CPT

## 2021-09-16 PROCEDURE — 82164 ANGIOTENSIN I ENZYME TEST: CPT

## 2021-09-16 PROCEDURE — 86147 CARDIOLIPIN ANTIBODY EA IG: CPT

## 2021-09-16 PROCEDURE — 62270 DX LMBR SPI PNXR: CPT | Performed by: PSYCHIATRY & NEUROLOGY

## 2021-09-16 PROCEDURE — 87483 CNS DNA AMP PROBE TYPE 12-25: CPT

## 2021-09-16 PROCEDURE — 87015 SPECIMEN INFECT AGNT CONCNTJ: CPT

## 2021-09-16 PROCEDURE — 009U3ZX DRAINAGE OF SPINAL CANAL, PERCUTANEOUS APPROACH, DIAGNOSTIC: ICD-10-PCS | Performed by: PSYCHIATRY & NEUROLOGY

## 2021-09-16 PROCEDURE — 85025 COMPLETE CBC W/AUTO DIFF WBC: CPT

## 2021-09-16 PROCEDURE — 74011000258 HC RX REV CODE- 258: Performed by: PSYCHIATRY & NEUROLOGY

## 2021-09-16 PROCEDURE — 99233 SBSQ HOSP IP/OBS HIGH 50: CPT | Performed by: PSYCHIATRY & NEUROLOGY

## 2021-09-16 RX ORDER — KETOROLAC TROMETHAMINE 30 MG/ML
15 INJECTION, SOLUTION INTRAMUSCULAR; INTRAVENOUS
Status: DISPENSED | OUTPATIENT
Start: 2021-09-16 | End: 2021-09-17

## 2021-09-16 RX ORDER — LIDOCAINE HYDROCHLORIDE 10 MG/ML
5 INJECTION, SOLUTION EPIDURAL; INFILTRATION; INTRACAUDAL; PERINEURAL ONCE
Status: DISPENSED | OUTPATIENT
Start: 2021-09-16 | End: 2021-09-16

## 2021-09-16 RX ORDER — DIAZEPAM 5 MG/1
5 TABLET ORAL
Status: COMPLETED | OUTPATIENT
Start: 2021-09-16 | End: 2021-09-16

## 2021-09-16 RX ORDER — HYDROMORPHONE HYDROCHLORIDE 1 MG/ML
0.5 INJECTION, SOLUTION INTRAMUSCULAR; INTRAVENOUS; SUBCUTANEOUS ONCE
Status: COMPLETED | OUTPATIENT
Start: 2021-09-16 | End: 2021-09-16

## 2021-09-16 RX ORDER — HYDROMORPHONE HYDROCHLORIDE 1 MG/ML
0.5 INJECTION, SOLUTION INTRAMUSCULAR; INTRAVENOUS; SUBCUTANEOUS
Status: DISCONTINUED | OUTPATIENT
Start: 2021-09-16 | End: 2021-09-17

## 2021-09-16 RX ORDER — AMOXICILLIN 250 MG
1 CAPSULE ORAL DAILY
Status: DISCONTINUED | OUTPATIENT
Start: 2021-09-16 | End: 2021-09-19 | Stop reason: HOSPADM

## 2021-09-16 RX ADMIN — Medication 10 ML: at 08:04

## 2021-09-16 RX ADMIN — ACETAMINOPHEN 650 MG: 325 TABLET ORAL at 09:06

## 2021-09-16 RX ADMIN — DOCUSATE SODIUM 50MG AND SENNOSIDES 8.6MG 1 TABLET: 8.6; 5 TABLET, FILM COATED ORAL at 13:23

## 2021-09-16 RX ADMIN — Medication 10 ML: at 16:24

## 2021-09-16 RX ADMIN — SODIUM CHLORIDE 1000 MG: 900 INJECTION INTRAVENOUS at 09:12

## 2021-09-16 RX ADMIN — HYDROMORPHONE HYDROCHLORIDE 0.5 MG: 1 INJECTION, SOLUTION INTRAMUSCULAR; INTRAVENOUS; SUBCUTANEOUS at 03:18

## 2021-09-16 RX ADMIN — KETOROLAC TROMETHAMINE 15 MG: 30 INJECTION, SOLUTION INTRAMUSCULAR; INTRAVENOUS at 23:13

## 2021-09-16 RX ADMIN — KETOROLAC TROMETHAMINE 15 MG: 30 INJECTION, SOLUTION INTRAMUSCULAR; INTRAVENOUS at 16:25

## 2021-09-16 RX ADMIN — DIAZEPAM 5 MG: 5 TABLET ORAL at 10:13

## 2021-09-16 RX ADMIN — Medication 10 ML: at 22:54

## 2021-09-16 RX ADMIN — ACETAMINOPHEN 650 MG: 325 TABLET ORAL at 01:55

## 2021-09-16 RX ADMIN — IMIPRAMINE HYDROCHLORIDE 25 MG: 25 TABLET ORAL at 22:54

## 2021-09-16 NOTE — PROGRESS NOTES
Problem: Falls - Risk of  Goal: *Absence of Falls  Description: Document Simonenemarychuy Lavelle Fall Risk and appropriate interventions in the flowsheet.   Outcome: Progressing Towards Goal  Note: Fall Risk Interventions:            Medication Interventions: Teach patient to arise slowly

## 2021-09-16 NOTE — PROGRESS NOTES
[facility]  [address] [facility], [address] [place] [address]  [phone]      Medical Progress Note      NAME: Esha Ace   :  2000  MRM:  [id number]    Date/Time of service: 2021         Subjective:     Chief Complaint:  Patient was personally seen and examined by me during this time period. Chart reviewed. F/u optic neuritis. Reports pain of neck at IJ site, changes in vision and occasional nausea, some constipation. Objective:       Vitals:       Last 24hrs VS reviewed since prior progress note.  Most recent are:    Visit Vitals  /81 (BP 1 Location: Left upper arm, BP Patient Position: At rest)   Pulse 69   Temp 97.6 °F (36.4 °C)   Resp 16   Ht 5' 7\" (1.702 m)   Wt 106.6 kg (235 lb)   SpO2 99%   BMI 36.81 kg/m²     SpO2 Readings from Last 6 Encounters:   21 99%   21 100%   19 98%          No intake or output data in the 24 hours ending 21 1312     Exam:     Physical Exam:    Gen:  Well-developed, well-nourished, in no acute distress  HEENT:  Pink conjunctivae, PERRL, hearing intact to voice, moist mucous membranes  Neck:  Supple, no tenderness to palpation,R IJ and dialysis line  Resp:  No accessory muscle use, clear breath sounds without wheezes rales or rhonchi  Card:  RRR, No murmurs, normal S1, S2, no peripheral edema  Abd:  Soft, non-tender, non-distended, normoactive bowel sounds are present  Musc:  No cyanosis or clubbing  Skin:  No rashes or ulcers, skin turgor is good  Neuro:  Cranial nerves 3-12 are grossly intact, no focal weakness, follows commands appropriately  Psych:  Oriented to person, place, and time, Alert with good insight      Medications Reviewed: (see below)    Lab Data Reviewed: (see below)    ______________________________________________________________________    Medications:     Current Facility-Administered Medications   Medication Dose Route Frequency    HYDROmorphone (DILAUDID) syringe 0.5 mg  0.5 mg IntraVENous Q4H PRN    lidocaine (PF) (XYLOCAINE) 10 mg/mL (1 %) injection 5 mL  5 mL Other ONCE    methylPREDNISolone (Solu-MEDROL) 1 g in 100 mL NS MBP  1 g IntraVENous QAM    sodium chloride (NS) flush 5-40 mL  5-40 mL IntraVENous Q8H    sodium chloride (NS) flush 5-40 mL  5-40 mL IntraVENous PRN    acetaminophen (TYLENOL) tablet 650 mg  650 mg Oral Q6H PRN    Or    acetaminophen (TYLENOL) suppository 650 mg  650 mg Rectal Q6H PRN    polyethylene glycol (MIRALAX) packet 17 g  17 g Oral DAILY PRN    ondansetron (ZOFRAN ODT) tablet 4 mg  4 mg Oral Q8H PRN    Or    ondansetron (ZOFRAN) injection 4 mg  4 mg IntraVENous Q6H PRN    [Held by provider] enoxaparin (LOVENOX) injection 40 mg  40 mg SubCUTAneous DAILY    imipramine (TOFRANIL) tablet 25 mg  25 mg Oral QHS          Lab Review:     Recent Labs     09/16/21  0318 09/15/21  1721 09/13/21  1837   WBC 15.4* 23.7* 11.2*   HGB 10.8* 11.3* 11.6   HCT 33.5* 35.1 36.6    362 286     Recent Labs     09/16/21  0804 09/16/21  0318 09/15/21  1721    138 139   K 3.9 3.8 3.8    109* 109*   CO2 28 25 26   GLU 85 95 97   BUN 15 14 13   CREA 0.63 0.67 0.69   CA 8.0* 8.1* 8.3*   MG 2.8* 2.2  --    ALB 2.9* 2.9* 3.1*   TBILI 0.5 0.4 0.3   ALT 22 20 23     No results found for: GLUCPOC       Assessment / Plan:     Acute optic neuritis/ Vision loss/ Demyelinating lesions: optic neuritis based on nerve edema, MRI report OP 9/08/21. MRI C-spine and T-spine show cord lesions. Chest x-ray unremarkable. Concern for demyelinating disease; lab work pending, neg findings thus far but many labs are still pending Continue IV steroids. Initially plan was for plasmapheresis, but will hold off per nephro. Remove dialysis line jovanny due to uncomfort of neck. S/op LP 9/16; serologies pending. Neurology following.                  Acute R neck pain: possibly due to IJ/dialysis line. CXR repeated today ok. IV Toradol and IV dilaudid PRN.  Remove temp dialysis lie as no further plans for PLEX. Nocturnal enuresis: Recently started imipramine OP. F/u urology OP.      Nausea/ Constipation: Zofran as needed. KUB with fecal stasis. Start bowel regimen.      Total time spent with patient: 32 Minutes **I personally saw and examined the patient during this time period**                 Care Plan discussed with: Patient and Nursing Staff    Discussed:  Care Plan    Prophylaxis:  Lovenox    Disposition:  Home w/Family           ___________________________________________________    Attending Physician: Dillan Walter DO

## 2021-09-16 NOTE — PROGRESS NOTES
Neurology - Inpatient Progress Note     Name:   Winsome Estes  MRN:    [id number]  [address] [facility] Date:   9/13/2021    Follow up:   Left optic neuritis, spinal cord lesions (cervical and thoracic), no Brain lesions    Interval History     Day 2/ 5 of IV solumedrol (1000 mg /day)    Pt resting in bed, reports left eye vision is improved, reports she has been ambulating, but sometimes left leg feels a little numb. Resulted labs: HIV 1/2 Non-reactive, TSH 0.97, FT4 0.9, B12 451, Vit D 22.9 (mild low), NMO IgG antibodies < 1.5 (0.0 to 3.0 considered negative)    Multiple other labs pending    CXR done yesterday AM was read as clear (no finding of nodules that would suggest sarcoidosis)    Discussed with patient and Mother (on phone) that NMO IgG ab is negative which creates uncertainty in the diagnosis and consequently treatment plan. Discussed with them that as pt is ambulatory, left eye vision is improving, I think we should not do Plasmapheresis or IVIG, but rather complete the 5 day course of IV Solumedrol, do LP this AM for additional diagnostic information, and have her see an MS specialist (Demyelinating disease specialist) at [facility] as outpatient for their review of all her labs/ CSF studies/ MRI images for final diagnosis (NMO vs MOG vs other). Pt and mother agreed with that plan. Exam: EOMI, VF normal bilateral, all quadrants, face symmetric, moving all extremities 5/5, mild reduced LT in left leg compared to right, normal LT in both arms. Stands w/o difficulty. Gait limited due to connected to central line.        Brief ROS: As noted above         No Known Allergies    Current Facility-Administered Medications:     HYDROmorphone (DILAUDID) syringe 0.5 mg, 0.5 mg, IntraVENous, Q4H PRN, Uhsa Sherman, DO    lidocaine (PF) (XYLOCAINE) 10 mg/mL (1 %) injection 5 mL, 5 mL, Other, ONCE, Shelia Moreau MD    methylPREDNISolone (Solu-MEDROL) 1 g in 100 mL NS MBP, 1 g, IntraVENous, QAM, Neldon Lennox, MD, Last Rate: 100 mL/hr at 09/16/21 0912, 1,000 mg at 09/16/21 0912    sodium chloride (NS) flush 5-40 mL, 5-40 mL, IntraVENous, Q8H, Annalee Morris MD, 10 mL at 09/16/21 0804    sodium chloride (NS) flush 5-40 mL, 5-40 mL, IntraVENous, PRN, Annalee Morris MD    acetaminophen (TYLENOL) tablet 650 mg, 650 mg, Oral, Q6H PRN, 650 mg at 09/16/21 0906 **OR** acetaminophen (TYLENOL) suppository 650 mg, 650 mg, Rectal, Q6H PRN, Annalee Morris MD    polyethylene glycol (MIRALAX) packet 17 g, 17 g, Oral, DAILY PRN, Annalee Morris MD    ondansetron (ZOFRAN ODT) tablet 4 mg, 4 mg, Oral, Q8H PRN **OR** ondansetron (ZOFRAN) injection 4 mg, 4 mg, IntraVENous, Q6H PRN, Annalee Morris MD    [Held by provider] enoxaparin (LOVENOX) injection 40 mg, 40 mg, SubCUTAneous, DAILY, Annalee Morris MD, 40 mg at 09/14/21 0941    imipramine (TOFRANIL) tablet 25 mg, 25 mg, Oral, QHS, Annalee Morris MD, 25 mg at 09/15/21 2247      PMHx: has a past medical history of Chronic mental illness and Overactive bladder. PSHx: has a past surgical history that includes ir insert non tunl cvc over 5 yrs (9/15/2021) and ir insert non tunl cvc over 5 yrs (9/15/2021). Impression / Plan       ICD-10-CM ICD-9-CM   1. Left optic neuritis  H46.9 377.30   2. Left eye pain  H57.12 379.91   3. Optic neuritis  H46.9 377.30   4. Demyelinating lesion (HCC)  G37.9 341.9   5. Anxiety  F41.9 300.00     Multiple labs pending to evaluate for the various causes of demyelinating illness (anti-MOG antibodies, Lyme antibodies, FTA-Abs, paraneoplastic antibodies, RF, anti-phospholipid antibodies, thyroid peroxidase antibodies, serum copper, etc.. )     Completed Lumbar Puncture (siting upright, opening pressure 46, closing pressure 34 cmH20, clear, colorless fluid, collected 4 tubes with 9 cc fluid each). Sent to lab for extensive studies. Opening and closing pressure were elevated but that may be due to body habitus 5'7\" 235 lbs.     D/c'd dialysis catheter  Complete 5 days of IV solumedrol  Will enter referral to [address] [place] [address] [facility] (Dr Stevie Rogers or Colleague, first available)  I will see patient as hospital follow up around 3 weeks after discharge    Will continue following during hospital admission        Signed By: Dia Rodarte MD     September 16, 2021

## 2021-09-16 NOTE — PROGRESS NOTES
Pt requesting something for pain. On call MD notified and ordered 2mg dose of morphine IV once and an additional dose to be given one hour later if pt still complaining of pain. Order placed and given at 2040. Pt still c/o pain 30 minutes later. MD notified via perfect serv that patient will need second dose. Second dose given at 2250.   0300: MD notified via [employer] that pt still complaining of pain. One time dose of 0.5mg IV dilaudid ordered. 0630: pt stated the IV dilaudid does not last any time at all. Will notify day shift RN.

## 2021-09-16 NOTE — PROGRESS NOTES
We are asked to stop PLEX because NMO ig G ab is negative less suspicious for neuromyelitis optica. No Plex and will remove temp HD catheter.

## 2021-09-16 NOTE — ROUTINE PROCESS
Right IJ TLC and temp HD sites cleansed using sterile technique. Blood aspirated from both ports of HD line without difficulty. Pt states pain at site, site clean, dry and intact. Surrounding skin red following tape removal. Pt lying flat in bed for removal as she is s/p lumbar puncture today. Temp HD line removed during valsalva and manual pressure held x 10 mins. Pts nurse at bedside and updated on line status. TLC central line dressing changed using biopatch and quik clot applied to dialysis catheter site. No bleeding or hematoma noted at site.

## 2021-09-16 NOTE — PROGRESS NOTES
1050  spinal tap performed  Dr. Haleigh Molina , pt tolerated well. Advised to remain  Flat for 2 hours up at  1250 pt states verbal understanding. Denies numbness and tingling    1200   Hemodialysis port removed , pt tolerated well. States pain is less since removed.

## 2021-09-16 NOTE — PROCEDURES
[facility]  [facility], [address], [address] [place] [address] [address]  [phone]      Patient Name/ MRN:  Mara Melgar / [id number]   :    2000  Procedure:   Lumbar puncture, Diagnostic (01517)  Procedure date:  2021    Location:   [facility], Inpatient    Reason:   Evaluate for left optic neuritis, multiple spinal cord lesiosn    Procedure description: Informed consent was obtained from the Patient. Pre-procedure time out was performed to verify correct patient, procedure, location, and marking correct site. The patient was placed in the Upright position, the lumbar region was prepped in typical sterile fashion and drapes applied. The lumbar spine was palpated and using anatomical landmarks, the L4-5 level wsa approximated. Local anesthesia applied with 5 mL 1% preservative free lidocaine. A  3.5 inch 22-gauge spinal needle was inserted into the interlaminar space. The stylette was removed and opening pressure was measured to be 46 cm of water. The CSF appeared clear, colorless. 9 cc was collected in each of 4 tube. The needle was restyletted and removed, the site cleaned, and a bandage applied. The patient tolerated the procedure well without any significant blood loss or other apparent complications. She was advised to lay flat for minimum 1 hour, 2 hours preferably to avoid developing spinal leak headache.      CSF was sent to lab for extensive CSF analysis    Total time performing procedure: 1025 AM to 1046 AM (21 minutes)      __________________  Holly Zarco MD      Signed By: Holly Zarco MD     2021

## 2021-09-17 LAB
ACE SERPL-CCNC: 32 U/L (ref 14–82)
ALBUMIN SERPL-MCNC: 3 G/DL (ref 3.5–5)
ALBUMIN/GLOB SERPL: 0.7 {RATIO} (ref 1.1–2.2)
ALP SERPL-CCNC: 73 U/L (ref 45–117)
ALT SERPL-CCNC: 24 U/L (ref 12–78)
ANA SER QL: NEGATIVE
ANION GAP SERPL CALC-SCNC: 4 MMOL/L (ref 5–15)
ANTI-HU AB: NEGATIVE TITER
ANTI-RI AB: NEGATIVE TITER
ANTI-YO ANTIBODIES, 808955: NEGATIVE TITER
AST SERPL-CCNC: 8 U/L (ref 15–37)
B BURGDOR IGG+IGM SER-ACNC: <0.91 ISR (ref 0–0.9)
BASOPHILS # BLD: 0 K/UL (ref 0–0.1)
BASOPHILS NFR BLD: 0 % (ref 0–1)
BILIRUB SERPL-MCNC: 0.3 MG/DL (ref 0.2–1)
BUN SERPL-MCNC: 12 MG/DL (ref 6–20)
BUN/CREAT SERPL: 23 (ref 12–20)
CALCIUM SERPL-MCNC: 8.1 MG/DL (ref 8.5–10.1)
CHLORIDE SERPL-SCNC: 108 MMOL/L (ref 97–108)
CO2 SERPL-SCNC: 26 MMOL/L (ref 21–32)
CREAT SERPL-MCNC: 0.53 MG/DL (ref 0.55–1.02)
DIFFERENTIAL METHOD BLD: ABNORMAL
ENA SS-A AB SER-ACNC: <0.2 AI (ref 0–0.9)
ENA SS-B AB SER-ACNC: <0.2 AI (ref 0–0.9)
EOSINOPHIL # BLD: 0 K/UL (ref 0–0.4)
EOSINOPHIL NFR BLD: 0 % (ref 0–7)
ERYTHROCYTE [DISTWIDTH] IN BLOOD BY AUTOMATED COUNT: 12.7 % (ref 11.5–14.5)
GLOBULIN SER CALC-MCNC: 4.6 G/DL (ref 2–4)
GLUCOSE SERPL-MCNC: 141 MG/DL (ref 65–100)
HCT VFR BLD AUTO: 34 % (ref 35–47)
HGB BLD-MCNC: 11.2 G/DL (ref 11.5–16)
IMM GRANULOCYTES # BLD AUTO: 0.1 K/UL (ref 0–0.04)
IMM GRANULOCYTES NFR BLD AUTO: 1 % (ref 0–0.5)
LYMPHOCYTES # BLD: 1.4 K/UL (ref 0.8–3.5)
LYMPHOCYTES NFR BLD: 9 % (ref 12–49)
MCH RBC QN AUTO: 28.6 PG (ref 26–34)
MCHC RBC AUTO-ENTMCNC: 32.9 G/DL (ref 30–36.5)
MCV RBC AUTO: 86.7 FL (ref 80–99)
MOG AB SER QL CBA IFA: NEGATIVE
MONOCYTES # BLD: 0.6 K/UL (ref 0–1)
MONOCYTES NFR BLD: 4 % (ref 5–13)
NEUTS SEG # BLD: 13.5 K/UL (ref 1.8–8)
NEUTS SEG NFR BLD: 86 % (ref 32–75)
NRBC # BLD: 0 K/UL (ref 0–0.01)
NRBC BLD-RTO: 0 PER 100 WBC
PLATELET # BLD AUTO: 335 K/UL (ref 150–400)
PMV BLD AUTO: 9.4 FL (ref 8.9–12.9)
POTASSIUM SERPL-SCNC: 3.8 MMOL/L (ref 3.5–5.1)
PROT SERPL-MCNC: 7.6 G/DL (ref 6.4–8.2)
RBC # BLD AUTO: 3.92 M/UL (ref 3.8–5.2)
SODIUM SERPL-SCNC: 138 MMOL/L (ref 136–145)
T PALLIDUM AB SER QL IF: NON REACTIVE
THYROPEROXIDASE AB SERPL-ACNC: 12 IU/ML (ref 0–34)
WBC # BLD AUTO: 15.6 K/UL (ref 3.6–11)

## 2021-09-17 PROCEDURE — 74011250636 HC RX REV CODE- 250/636: Performed by: PSYCHIATRY & NEUROLOGY

## 2021-09-17 PROCEDURE — 36415 COLL VENOUS BLD VENIPUNCTURE: CPT

## 2021-09-17 PROCEDURE — 65270000029 HC RM PRIVATE

## 2021-09-17 PROCEDURE — 74011250636 HC RX REV CODE- 250/636: Performed by: INTERNAL MEDICINE

## 2021-09-17 PROCEDURE — 99233 SBSQ HOSP IP/OBS HIGH 50: CPT | Performed by: PSYCHIATRY & NEUROLOGY

## 2021-09-17 PROCEDURE — 74011000258 HC RX REV CODE- 258: Performed by: PSYCHIATRY & NEUROLOGY

## 2021-09-17 PROCEDURE — 85025 COMPLETE CBC W/AUTO DIFF WBC: CPT

## 2021-09-17 PROCEDURE — 74011250637 HC RX REV CODE- 250/637: Performed by: HOSPITALIST

## 2021-09-17 PROCEDURE — 80053 COMPREHEN METABOLIC PANEL: CPT

## 2021-09-17 PROCEDURE — 74011250637 HC RX REV CODE- 250/637: Performed by: INTERNAL MEDICINE

## 2021-09-17 RX ORDER — DIPHENHYDRAMINE HCL 25 MG
25 CAPSULE ORAL ONCE
Status: COMPLETED | OUTPATIENT
Start: 2021-09-17 | End: 2021-09-17

## 2021-09-17 RX ORDER — HYDROCODONE BITARTRATE AND ACETAMINOPHEN 5; 325 MG/1; MG/1
1 TABLET ORAL
Status: DISCONTINUED | OUTPATIENT
Start: 2021-09-17 | End: 2021-09-19 | Stop reason: HOSPADM

## 2021-09-17 RX ADMIN — SODIUM CHLORIDE 1000 MG: 900 INJECTION INTRAVENOUS at 09:36

## 2021-09-17 RX ADMIN — Medication 10 ML: at 22:06

## 2021-09-17 RX ADMIN — Medication 10 ML: at 06:50

## 2021-09-17 RX ADMIN — ACETAMINOPHEN 650 MG: 325 TABLET ORAL at 22:06

## 2021-09-17 RX ADMIN — DIPHENHYDRAMINE HYDROCHLORIDE 25 MG: 25 CAPSULE ORAL at 05:05

## 2021-09-17 RX ADMIN — IMIPRAMINE HYDROCHLORIDE 25 MG: 25 TABLET ORAL at 22:07

## 2021-09-17 RX ADMIN — Medication 10 ML: at 16:00

## 2021-09-17 RX ADMIN — KETOROLAC TROMETHAMINE 15 MG: 30 INJECTION, SOLUTION INTRAMUSCULAR; INTRAVENOUS at 08:21

## 2021-09-17 NOTE — PROGRESS NOTES
[facility] 79  [address] [facility], [address] [place] [address]  [phone]      Medical Progress Note      NAME: Tae Mirza   :  2000  MRM:  [id number]    Date/Time of service: 2021         Subjective:     Chief Complaint: \"I'm better\"     Pt seen and examined. Vision and eye pain improving. Objective:       Vitals:       Last 24hrs VS reviewed since prior progress note.  Most recent are:    Visit Vitals  /69 (BP 1 Location: Left upper arm, BP Patient Position: Sitting)   Pulse 95   Temp 98.3 °F (36.8 °C)   Resp 18   Ht 5' 7\" (1.702 m)   Wt 106.6 kg (235 lb)   SpO2 96%   BMI 36.81 kg/m²     SpO2 Readings from Last 6 Encounters:   21 96%   21 100%   19 98%          No intake or output data in the 24 hours ending 21 1431     Exam:     Physical Exam:    Gen:  Well-developed, well-nourished, in no acute distress  HEENT:  Pink conjunctivae, PERRL, hearing intact to voice, moist mucous membranes  Neck:  Supple, no tenderness to palpation   Resp:  No accessory muscle use, clear breath sounds without wheezes rales or rhonchi  Card:  RRR, No murmurs, normal S1, S2, no peripheral edema  Abd:  Soft, non-tender, non-distended, normoactive bowel sounds are present  Musc:  No cyanosis or clubbing  Skin:  No rashes or ulcers, skin turgor is good  Neuro:  Cranial nerves 3-12 are grossly intact, no focal weakness, follows commands appropriately  Psych:  Oriented to person, place, and time, Alert with good insight      Medications Reviewed: (see below)    Lab Data Reviewed: (see below)    ______________________________________________________________________    Medications:     Current Facility-Administered Medications   Medication Dose Route Frequency    HYDROcodone-acetaminophen (NORCO) 5-325 mg per tablet 1 Tablet  1 Tablet Oral Q6H PRN    senna-docusate (PERICOLACE) 8.6-50 mg per tablet 1 Tablet  1 Tablet Oral DAILY    methylPREDNISolone (Solu-MEDROL) 1 g in 100 mL NS MBP  1 g IntraVENous QAM    sodium chloride (NS) flush 5-40 mL  5-40 mL IntraVENous Q8H    sodium chloride (NS) flush 5-40 mL  5-40 mL IntraVENous PRN    acetaminophen (TYLENOL) tablet 650 mg  650 mg Oral Q6H PRN    Or    acetaminophen (TYLENOL) suppository 650 mg  650 mg Rectal Q6H PRN    polyethylene glycol (MIRALAX) packet 17 g  17 g Oral DAILY PRN    ondansetron (ZOFRAN ODT) tablet 4 mg  4 mg Oral Q8H PRN    Or    ondansetron (ZOFRAN) injection 4 mg  4 mg IntraVENous Q6H PRN    [Held by provider] enoxaparin (LOVENOX) injection 40 mg  40 mg SubCUTAneous DAILY    imipramine (TOFRANIL) tablet 25 mg  25 mg Oral QHS          Lab Review:     Recent Labs     09/17/21  0354 09/16/21  0318 09/15/21  1721   WBC 15.6* 15.4* 23.7*   HGB 11.2* 10.8* 11.3*   HCT 34.0* 33.5* 35.1    305 362     Recent Labs     09/17/21  0354 09/16/21  0804 09/16/21  0318    140 138   K 3.8 3.9 3.8    108 109*   CO2 26 28 25   * 85 95   BUN 12 15 14   CREA 0.53* 0.63 0.67   CA 8.1* 8.0* 8.1*   MG  --  2.8* 2.2   ALB 3.0* 2.9* 2.9*   TBILI 0.3 0.5 0.4   ALT 24 22 20     No results found for: GLUCPOC       Assessment / Plan:   Acute optic neuritis/ Vision loss/ Demyelinating lesions: optic neuritis based on nerve edema, MRI report OP 9/08/21. MRI C-spine and T-spine show cord lesion. S/p LP   -on IV methylpred continue daily for 5 doses   -outpt neurology eval   -serology pending to eval possible inciting etiology                  Nocturnal enuresis: Recently started imipramine OP. F/u urology OP.      Nausea/ Constipation: resolved  -monitor     Leukocytosis - likely 2/2 steroids.  No fevers/chills   -monitor    Total time spent with patient: 30  Minutes     **I personally saw and examined the patient during this time period**                 Care Plan discussed with: Patient and Nursing Staff , Pt's mother     Discussed:  Care Plan    Prophylaxis:  Lovenox    Disposition:  Home w/Family           ___________________________________________________    Attending Physician: Abby Payan MD

## 2021-09-17 NOTE — PROGRESS NOTES
Neurology - Inpatient Progress Note     Name:   Gabby Enriquez  MRN:    [id number]  [facility] Date:   9/13/2021    Follow up:   Left optic neuritis, spinal cord lesions (cervical and thoracic), no Brain lesions    Interval History     Day 3/ 5 of IV solumedrol (1000 mg /day)    Pt sitting up in bed, eating lunch  Says feeling better  Left vision is improved compared to when she was first dx with Optic neuritis by Ophtho  A/O x 3  Moving all exts 5/5  Intact LT in all exts    Resulted labs: HIV 1/2 Non-reactive, TSH 0.97, FT4 0.9, B12 451, Vit D 22.9 (mild low), NMO IgG antibodies < 1.5 (0.0 to 3.0 considered negative), Serum ACE 32 (normal), paraneoplastic antibody panel negative, RF < 10, Thyroid peroxidase ab 12 (negative), MOG Antibody (cell based assay) negative    Multiple other labs pending    CXR was read as clear (no finding of nodules that would suggest sarcoidosis)    LP results so far: Cell Count (tube 1, clear colorless, 7 WBC/ 2 RBC), Cell Count (tube 4, clear colorless, 2 WBC/ 0 RBC), Glucose 58, Protein 25,  Meningitis pathogens panel negative, [place] Ink negative, Cryptococcal antigen negative, MS Panel (PRELIM): mild elevated IgG synth rate 0.9 / remainder of panel is pending.         Brief ROS: As noted above         No Known Allergies    Current Facility-Administered Medications:     HYDROcodone-acetaminophen (NORCO) 5-325 mg per tablet 1 Tablet, 1 Tablet, Oral, Q6H PRN, Yuridia Carr MD    senna-docusate (PERICOLACE) 8.6-50 mg per tablet 1 Tablet, 1 Tablet, Oral, DAILY, Lane, Usha, DO, 1 Tablet at 09/16/21 1323    ketorolac (TORADOL) injection 15 mg, 15 mg, IntraVENous, Q6H PRN, Yuridia Carr MD, 15 mg at 09/17/21 0821    methylPREDNISolone (Solu-MEDROL) 1 g in 100 mL NS MBP, 1 g, IntraVENous, Prieto DESAI Mudassar, MD, Last Rate: 100 mL/hr at 09/17/21 0936, 1,000 mg at 09/17/21 0936    sodium chloride (NS) flush 5-40 mL, 5-40 mL, IntraVENous, Q8H, Edward Venegas MD, 10 mL at 09/17/21 0650    sodium chloride (NS) flush 5-40 mL, 5-40 mL, IntraVENous, PRN, Elizabeth Mims MD    acetaminophen (TYLENOL) tablet 650 mg, 650 mg, Oral, Q6H PRN, 650 mg at 09/16/21 0906 **OR** acetaminophen (TYLENOL) suppository 650 mg, 650 mg, Rectal, Q6H PRN, Elizabeth Mims MD    polyethylene glycol (MIRALAX) packet 17 g, 17 g, Oral, DAILY PRN, Elizabeth Mims MD    ondansetron (ZOFRAN ODT) tablet 4 mg, 4 mg, Oral, Q8H PRN **OR** ondansetron (ZOFRAN) injection 4 mg, 4 mg, IntraVENous, Q6H PRN, Elizabeth Mims MD    [Held by provider] enoxaparin (LOVENOX) injection 40 mg, 40 mg, SubCUTAneous, DAILY, Elizabeth Mims MD, 40 mg at 09/14/21 0941    imipramine (TOFRANIL) tablet 25 mg, 25 mg, Oral, QHS, Elizabeth Mims MD, 25 mg at 09/16/21 2254      PMHx: has a past medical history of Chronic mental illness and Overactive bladder. PSHx: has a past surgical history that includes ir insert non tunl cvc over 5 yrs (9/15/2021) and ir insert non tunl cvc over 5 yrs (9/15/2021). Impression / Plan       ICD-10-CM ICD-9-CM   1. Left optic neuritis  H46.9 377.30   2. Left eye pain  H57.12 379.91   3. Optic neuritis  H46.9 377.30   4. Demyelinating lesion (HCC)  G37.9 341.9   5. Anxiety  F41.9 300.00     Multiple labs pending to evaluate for the various causes of demyelinating illness (anti-MOG antibodies, Lyme antibodies, FTA-Abs, paraneoplastic antibodies, RF, anti-phospholipid antibodies, thyroid peroxidase antibodies, serum copper, etc.. )     Completed Lumbar Puncture (siting upright, opening pressure 46, closing pressure 34 cmH20, clear, colorless fluid, collected 4 tubes with 9 cc fluid each). Sent to lab for extensive studies. Opening and closing pressure were elevated but that may be due to body habitus 5'7\" 235 lbs.     Day 3 of 5 IV solumedrol    D/c home after completing 5 day course of IV steroids    Entered referral on 9-16-21 to see [facility] [facility] (Dr Stevie Rogers or Colleague, first available) as outpatient    F/u wit me in 3 weeks to go over all lab results    Dr Bhupendra Haynes taking over neurology service today          Signed By: Michoacano Martinez MD     September 17, 2021

## 2021-09-17 NOTE — PROGRESS NOTES
9/17/2021  3:15 PM    RUR:  7%  Risk Level: [x]Low []Moderate []High  Value-based purchasing: [] Yes [x] No  Bundle patient: [] Yes [x] No   Specify:     Transition of care plan:  1. Plan for 2 more days of IV steroids to complete 5-day course  2. Home with family assistance   3. Outpatient follow-up - neurology; PCP  4. Pt's family to transport  5.  CM to continue to follow    Erik Amaral RN

## 2021-09-17 NOTE — PROGRESS NOTES
Pt c/o redness and itching below PICC line site. The area had small red splotchy spots. MD notified via perfectserv. Orders for 25mg benadryl PO once.    0730: the site is unchanged at shift report

## 2021-09-18 LAB
ALBUMIN SERPL-MCNC: 2.8 G/DL (ref 3.5–5)
ALBUMIN/GLOB SERPL: 0.6 {RATIO} (ref 1.1–2.2)
ALP SERPL-CCNC: 64 U/L (ref 45–117)
ALT SERPL-CCNC: 23 U/L (ref 12–78)
ANION GAP SERPL CALC-SCNC: 1 MMOL/L (ref 5–15)
AST SERPL-CCNC: 7 U/L (ref 15–37)
BASOPHILS # BLD: 0 K/UL (ref 0–0.1)
BASOPHILS NFR BLD: 0 % (ref 0–1)
BILIRUB SERPL-MCNC: 0.3 MG/DL (ref 0.2–1)
BUN SERPL-MCNC: 14 MG/DL (ref 6–20)
BUN/CREAT SERPL: 20 (ref 12–20)
CALCIUM SERPL-MCNC: 8.3 MG/DL (ref 8.5–10.1)
CHLORIDE SERPL-SCNC: 108 MMOL/L (ref 97–108)
CO2 SERPL-SCNC: 29 MMOL/L (ref 21–32)
CREAT SERPL-MCNC: 0.7 MG/DL (ref 0.55–1.02)
DIFFERENTIAL METHOD BLD: ABNORMAL
EOSINOPHIL # BLD: 0 K/UL (ref 0–0.4)
EOSINOPHIL NFR BLD: 0 % (ref 0–7)
ERYTHROCYTE [DISTWIDTH] IN BLOOD BY AUTOMATED COUNT: 12.8 % (ref 11.5–14.5)
GLOBULIN SER CALC-MCNC: 4.4 G/DL (ref 2–4)
GLUCOSE SERPL-MCNC: 142 MG/DL (ref 65–100)
HCT VFR BLD AUTO: 34.7 % (ref 35–47)
HGB BLD-MCNC: 11.2 G/DL (ref 11.5–16)
IMM GRANULOCYTES # BLD AUTO: 0.2 K/UL (ref 0–0.04)
IMM GRANULOCYTES NFR BLD AUTO: 1 % (ref 0–0.5)
LYMPHOCYTES # BLD: 2.1 K/UL (ref 0.8–3.5)
LYMPHOCYTES NFR BLD: 10 % (ref 12–49)
M TB IFN-G BLD-IMP: NEGATIVE
MCH RBC QN AUTO: 27.6 PG (ref 26–34)
MCHC RBC AUTO-ENTMCNC: 32.3 G/DL (ref 30–36.5)
MCV RBC AUTO: 85.5 FL (ref 80–99)
MONOCYTES # BLD: 1.3 K/UL (ref 0–1)
MONOCYTES NFR BLD: 6 % (ref 5–13)
NEUTS SEG # BLD: 17.2 K/UL (ref 1.8–8)
NEUTS SEG NFR BLD: 83 % (ref 32–75)
NRBC # BLD: 0 K/UL (ref 0–0.01)
NRBC BLD-RTO: 0 PER 100 WBC
PLATELET # BLD AUTO: 350 K/UL (ref 150–400)
PMV BLD AUTO: 9.6 FL (ref 8.9–12.9)
POTASSIUM SERPL-SCNC: 4 MMOL/L (ref 3.5–5.1)
PROT SERPL-MCNC: 7.2 G/DL (ref 6.4–8.2)
QUANTIFERON CRITERIA, QFI1T: NORMAL
QUANTIFERON MITOGEN VALUE: >10 IU/ML
QUANTIFERON NIL VALUE: 0.01 IU/ML
QUANTIFERON TB1 AG: 0 IU/ML
QUANTIFERON TB2 AG: 0.06 IU/ML
RBC # BLD AUTO: 4.06 M/UL (ref 3.8–5.2)
SODIUM SERPL-SCNC: 138 MMOL/L (ref 136–145)
WBC # BLD AUTO: 20.9 K/UL (ref 3.6–11)

## 2021-09-18 PROCEDURE — 74011250636 HC RX REV CODE- 250/636: Performed by: PSYCHIATRY & NEUROLOGY

## 2021-09-18 PROCEDURE — 36415 COLL VENOUS BLD VENIPUNCTURE: CPT

## 2021-09-18 PROCEDURE — 80053 COMPREHEN METABOLIC PANEL: CPT

## 2021-09-18 PROCEDURE — 99233 SBSQ HOSP IP/OBS HIGH 50: CPT | Performed by: PSYCHIATRY & NEUROLOGY

## 2021-09-18 PROCEDURE — 74011250637 HC RX REV CODE- 250/637: Performed by: INTERNAL MEDICINE

## 2021-09-18 PROCEDURE — 74011000258 HC RX REV CODE- 258: Performed by: PSYCHIATRY & NEUROLOGY

## 2021-09-18 PROCEDURE — 85025 COMPLETE CBC W/AUTO DIFF WBC: CPT

## 2021-09-18 PROCEDURE — 65270000029 HC RM PRIVATE

## 2021-09-18 RX ADMIN — IMIPRAMINE HYDROCHLORIDE 25 MG: 25 TABLET ORAL at 21:31

## 2021-09-18 RX ADMIN — Medication 10 ML: at 21:22

## 2021-09-18 RX ADMIN — SODIUM CHLORIDE 1000 MG: 900 INJECTION INTRAVENOUS at 10:27

## 2021-09-18 RX ADMIN — POLYETHYLENE GLYCOL 3350 17 G: 17 POWDER, FOR SOLUTION ORAL at 21:31

## 2021-09-18 RX ADMIN — DOCUSATE SODIUM 50MG AND SENNOSIDES 8.6MG 1 TABLET: 8.6; 5 TABLET, FILM COATED ORAL at 08:34

## 2021-09-18 RX ADMIN — Medication 10 ML: at 13:20

## 2021-09-18 RX ADMIN — Medication 10 ML: at 06:23

## 2021-09-18 NOTE — PROGRESS NOTES
Problem: Falls - Risk of  Goal: *Absence of Falls  Description: Document Nickie Pillow Fall Risk and appropriate interventions in the flowsheet.   Outcome: Progressing Towards Goal  Note: Fall Risk Interventions:            Medication Interventions: Patient to call before getting OOB    Elimination Interventions: Call light in reach

## 2021-09-18 NOTE — PROGRESS NOTES
[facility] 79  [address]  [phone]      Medical Progress Note      NAME: Joann Neumann   :  2000  MRM:  [id number]    Date/Time of service: 2021         Subjective:     Chief Complaint: \"I'm okay\"      Pt seen and examined. Vision continues to improve as does eye pain        Objective:       Vitals:       Last 24hrs VS reviewed since prior progress note.  Most recent are:    Visit Vitals  /74   Pulse 79   Temp 98.2 °F (36.8 °C)   Resp 16   Ht 5' 7\" (1.702 m)   Wt 106.6 kg (235 lb)   SpO2 99%   BMI 36.81 kg/m²     SpO2 Readings from Last 6 Encounters:   21 99%   21 100%   19 98%          No intake or output data in the 24 hours ending 21 1353     Exam:     Physical Exam:    Gen:  Well-developed, well-nourished, in no acute distress  HEENT:  Pink conjunctivae, PERRL, hearing intact to voice, moist mucous membranes  Neck:  Supple, no tenderness to palpation   Resp:  No accessory muscle use, clear breath sounds without wheezes rales or rhonchi  Card:  RRR, No murmurs, normal S1, S2, no peripheral edema  Abd:  Soft, non-tender, non-distended, normoactive bowel sounds are present  Musc:  No cyanosis or clubbing  Skin:  No rashes or ulcers, skin turgor is good  Neuro:  Cranial nerves 3-12 are grossly intact, no focal weakness, follows commands appropriately  Psych:  Oriented to person, place, and time, Alert with good insight      Medications Reviewed: (see below)    Lab Data Reviewed: (see below)    ______________________________________________________________________    Medications:     Current Facility-Administered Medications   Medication Dose Route Frequency    [START ON 2021] methylPREDNISolone (Solu-MEDROL) 1 g in 100 mL NS MBP  1 g IntraVENous QAM    HYDROcodone-acetaminophen (NORCO) 5-325 mg per tablet 1 Tablet  1 Tablet Oral Q6H PRN    senna-docusate (PERICOLACE) 8.6-50 mg per tablet 1 Tablet  1 Tablet Oral DAILY    sodium chloride (NS) flush 5-40 mL  5-40 mL IntraVENous Q8H    sodium chloride (NS) flush 5-40 mL  5-40 mL IntraVENous PRN    acetaminophen (TYLENOL) tablet 650 mg  650 mg Oral Q6H PRN    Or    acetaminophen (TYLENOL) suppository 650 mg  650 mg Rectal Q6H PRN    polyethylene glycol (MIRALAX) packet 17 g  17 g Oral DAILY PRN    ondansetron (ZOFRAN ODT) tablet 4 mg  4 mg Oral Q8H PRN    Or    ondansetron (ZOFRAN) injection 4 mg  4 mg IntraVENous Q6H PRN    [Held by provider] enoxaparin (LOVENOX) injection 40 mg  40 mg SubCUTAneous DAILY    imipramine (TOFRANIL) tablet 25 mg  25 mg Oral QHS          Lab Review:     Recent Labs     09/18/21 0435 09/17/21 0354 09/16/21 0318   WBC 20.9* 15.6* 15.4*   HGB 11.2* 11.2* 10.8*   HCT 34.7* 34.0* 33.5*    335 305     Recent Labs     09/18/21  0435 09/17/21  0354 09/16/21  0804 09/16/21 0318 09/16/21 0318    138 140   < > 138   K 4.0 3.8 3.9   < > 3.8    108 108   < > 109*   CO2 29 26 28   < > 25   * 141* 85   < > 95   BUN 14 12 15   < > 14   CREA 0.70 0.53* 0.63   < > 0.67   CA 8.3* 8.1* 8.0*   < > 8.1*   MG  --   --  2.8*  --  2.2   ALB 2.8* 3.0* 2.9*   < > 2.9*   TBILI 0.3 0.3 0.5   < > 0.4   ALT 23 24 22   < > 20    < > = values in this interval not displayed. No results found for: GLUCPOC       Assessment / Plan:   Acute optic neuritis/ Vision loss/ Demyelinating lesions: optic neuritis based on nerve edema, MRI report OP 9/08/21. MRI C-spine and T-spine show cord lesion. S/p LP   -on IV methylpred continue daily for 5 doses; last dose in the AM    -outpt neurology eval   -serology pending to eval possible inciting etiology: RF neg, ACE neg, CAROL pending, anti-NMO neg, copper pending, T pallidum neg, HIV neg, Lyme neg, SS-A/SS-B neg,   -CSF oligoclonal bands pending                  Nocturnal enuresis: Recently started imipramine OP.  F/u urology OP.      Nausea/ Constipation: resolved    Leukocytosis - likely 2/2 steroids. No fevers/chills.  Check procalcitonin and repeat CBC in the AM     Total time spent with patient: 30  Minutes     **I personally saw and examined the patient during this time period**                 Care Plan discussed with: Patient and Nursing Staff , Pt's mother     Discussed:  Care Plan    Prophylaxis:  Lovenox    Disposition:  Home w/Family           ___________________________________________________    Attending Physician: Abby Payan MD

## 2021-09-18 NOTE — PROGRESS NOTES
[facility] and [address] at [facility] at [address], [address] [place] [address] [address] [facility], [address] [place] [address]   [phone]              Chief Complaint   Patient presents with    Eye Pain     Current Facility-Administered Medications   Medication Dose Route Frequency Provider Last Rate Last Admin    HYDROcodone-acetaminophen ([address]) 5-325 mg per tablet 1 Tablet  1 Tablet Oral Q6H PRN Nydia Malone MD        senna-docusate (PERICOLACE) 8.6-50 mg per tablet 1 Tablet  1 Tablet Oral DAILY Nolberto Rolls, DO   1 Tablet at 09/18/21 0834    methylPREDNISolone (Solu-MEDROL) 1 g in 100 mL NS MBP  1 g IntraVENous QAM Mikayla Nielsen  mL/hr at 09/18/21 1027 1,000 mg at 09/18/21 1027    sodium chloride (NS) flush 5-40 mL  5-40 mL IntraVENous Q8H Kin Gordon MD   10 mL at 09/18/21 0623    sodium chloride (NS) flush 5-40 mL  5-40 mL IntraVENous PRN Kin Gordon MD        acetaminophen (TYLENOL) tablet 650 mg  650 mg Oral Q6H PRN Kin Gordon MD   650 mg at 09/17/21 2206    Or    acetaminophen (TYLENOL) suppository 650 mg  650 mg Rectal Q6H PRN Kin Gordon MD        polyethylene glycol (MIRALAX) packet 17 g  17 g Oral DAILY PRN Kin Gordon MD        ondansetron (ZOFRAN ODT) tablet 4 mg  4 mg Oral Q8H PRN Kin Gordon MD        Or    [facility]ron [facility]) injection 4 mg  4 mg IntraVENous Q6H PRN Kin Gordon MD        [Held by provider] enoxaparin (LOVENOX) injection 40 mg  40 mg SubCUTAneous DAILY Kin Gordon MD   40 mg at 09/14/21 0941    imipramine (TOFRANIL) tablet 25 mg  25 mg Oral QHS Kin Gordon MD   25 mg at 09/17/21 2207      No Known Allergies  Social History     Tobacco Use    Smoking status: Never Smoker    Smokeless tobacco: Never Used   Substance Use Topics    Alcohol use: Never    Drug use: Not on file     23year old we are following for possible NMO versus MS versus isolated optic neuritis who was admitted with acute optic Neuritis. Discussed with Dr. Shannan Amato and chart reviewed. Plan has been for her to complete 5 days of IV Solu-Medrol and then to follow-up in the clinic as well as have been outpatient consultation with Dr. Aristeo Brown at [facility]. Presently she is on day 4/5 of Solu-Medrol. No events overnight  This morning she has no new complaint. She indicates that her vision is getting better and she is able to read more things across the room on the white board. No complaints of motor focality    Examination  Visit Vitals  /74   Pulse 79   Temp 98.2 °F (36.8 °C)   Resp 16   Ht 5' 7\" (1.702 m)   Wt 106.6 kg (235 lb)   LMP 09/03/2021 (Approximate)   SpO2 99%   BMI 36.81 kg/m²   She is a very pleasant lady. She is sitting up in bed using her computer. She is awake alert oriented and conversant. Speech and language normal.  Normal cognition. No ataxia.     Labs this morning with metabolic panel with a slightly high glucose at 142 otherwise unremarkable  CBC elevated white count--steroid effect    CSF analysis  Cell count 2 red cells and 7 white cells tube 1 and 10 reds 2 white cells tube 4  Protein 25  Glucose 58  Hungary ink no encapsulated yeast  Lyme titer pending  Meningitis pathogens negative  NMO IgG pending  NMDA receptor pending  ACE level pending  MS panel with elevated IgG index at 0.9 and IgG/albumin ratio elevated at 0.39 with oligoclonal bands pending  Culture with no growth to date  Cryptococcal antigen negative    Other laboratory analyses  Antiphospholipid antibodies pending  Anti-MOG negative  Thyroid peroxidase antibody normal  Rheumatoid factor normal anti Hu, Ri, Yo antibody profile negative  HIV nonreactive  T. pallidum antibody nonreactive  Western blot Lyme negative  Vitamin E in process  Sjogren's antibodies negative  ACE level in serum negative  CAROL negative  QuantiFERON gold negative  Aquaporin receptor (NMO)  non cell-based negative  C-reactive protein 1.38  Sedimentation rate 54    Imaging  MRI of the brain with signal abnormality and enhancement along the left optic nerve  MRI cervical spine with diffuse cord signal abnormality C3-C4 as well as C6-C7 and T1 without enhancement  MRI thoracic spine with diffuse cord signal abnormality T1-T10    Impression/Plan  18-year-old lady with optic neuritis left-sided optic neuritis and MRI of the brain without lesions that would be suggestive of multiple sclerosis but with enhancement and lesion of the optic nerve and with extensive cord signal abnormality quite suspicious for neuromyelitis optica. Multiple sclerosis and sarcoid would also still be in the differential as well. She will complete day 5 of Solu-Medrol at 8 AM in the morning and once that has been completed then I am fine with her being discharged to home with outpatient follow-up. Her outpatient follow-up will consist of a visit with Dr. Joao Newsome in 3 weeks to discuss the remaining test results that are pending as well as to check her clinical status following her 5 days of Solu-Medrol. She will also see Dr. Alina Olivo or associate at [facility] as Dr. Alina Olivo is an expert in demyelinating disease and we would like her to weigh in in terms of this question of neuromyelitis optica as this entity certainly is treated differently than multiple sclerosis but also has a different and most often more ominous prognosis than those MS    We will return here as needed    Jerry Alicia MD        This note was created using voice recognition software. Despite editing, there may be syntax errors.

## 2021-09-19 VITALS
RESPIRATION RATE: 18 BRPM | TEMPERATURE: 98.2 F | DIASTOLIC BLOOD PRESSURE: 70 MMHG | WEIGHT: 235 LBS | SYSTOLIC BLOOD PRESSURE: 110 MMHG | HEART RATE: 69 BPM | OXYGEN SATURATION: 98 % | BODY MASS INDEX: 36.88 KG/M2 | HEIGHT: 67 IN

## 2021-09-19 LAB — PROCALCITONIN SERPL-MCNC: <0.05 NG/ML

## 2021-09-19 PROCEDURE — 36415 COLL VENOUS BLD VENIPUNCTURE: CPT

## 2021-09-19 PROCEDURE — 84145 PROCALCITONIN (PCT): CPT

## 2021-09-19 PROCEDURE — 74011000258 HC RX REV CODE- 258: Performed by: INTERNAL MEDICINE

## 2021-09-19 PROCEDURE — 74011250636 HC RX REV CODE- 250/636: Performed by: INTERNAL MEDICINE

## 2021-09-19 RX ADMIN — SODIUM CHLORIDE 1000 MG: 900 INJECTION INTRAVENOUS at 06:04

## 2021-09-19 RX ADMIN — Medication 10 ML: at 06:04

## 2021-09-19 NOTE — PROGRESS NOTES
9/19/2021  8:46 AM    RUR:  10%  Risk Level: [x]Low []Moderate []High  Value-based purchasing: [] Yes [x] No  Bundle patient: [] Yes [x] No   Specify:     Transition of care plan:  1. Plan for final dose of IV steroid today; discharge ordered   2. Home with family assistance   3. Outpatient follow-up - neurology; PCP; urology  4. Pt's family to transport  5. No further CM needs identified    Care Management Interventions  PCP Verified by CM:  Yes  Support Systems: Parent(s)  Confirm Follow Up Transport: Family  The Plan for Transition of Care is Related to the Following Treatment Goals : Opic neuritis  Discharge Location  Discharge Placement: Home with family assistance    Cliff Grayson RN

## 2021-09-19 NOTE — DISCHARGE INSTRUCTIONS
HOSPITALIST DISCHARGE INSTRUCTIONS  NAME: Jose C Burkett   :  2000   MRN:  [id number]     Date/Time:  2021 7:36 AM    ADMIT DATE: 2021     DISCHARGE DATE: 2021     ADMITTING DIAGNOSIS:  Optic neuritis     DISCHARGE DIAGNOSIS:  As above    MEDICATIONS:     · It is important that you take the medication exactly as they are prescribed. · Keep your medication in the bottles provided by the pharmacist and keep a list of the medication names, dosages, and times to be taken in your wallet. · Do not take other medications without consulting your doctor. Pain Management: per above medications    What to do at Home    Recommended diet:  Regular Diet    Recommended activity: Activity as tolerated    If you experience any of the following symptoms then please call your primary care physician or return to the emergency room if you cannot get hold of your doctor:  Fever, chills, nausea, vomiting, diarrhea, change in mentation, falling, bleeding, shortness of breath, chest pain    Follow Up:  Dr. Amairani Menon [place]  you are to call and set up an appointment to see them in 2 weeks. Follow-up Information     Follow up With Specialties Details Why Contact Info    Troy Lockhart MD Neurology Schedule an appointment as soon as possible for a visit Please see Dr. Radha Calderon for a second opinion [address] [place] [address]       Naomi Read MD Neurology, Pain Management  Call to make an appointment [facility] follow up in 3 weeks with Dr Tiana Stringer with Rodrick Johnson to facilitate this appt as it will be a work in appointment [address]  [facility] 99 [address] [place], [place] Physician Assistant   [address]  [phone]            Information obtained by :  I understand that if any problems occur once I am at home I am to contact my physician.     I understand and acknowledge receipt of the instructions indicated above.                                                                                                                                            Physician's or R.N.'s Signature                                                                  Date/Time                                                                                                                                              Patient or Representative Signature                                                          Date/Time

## 2021-09-19 NOTE — PROGRESS NOTES
Problem: Falls - Risk of  Goal: *Absence of Falls  Description: Document Flor Garrett Fall Risk and appropriate interventions in the flowsheet.   Outcome: Progressing Towards Goal  Note: Fall Risk Interventions:            Medication Interventions: Patient to call before getting OOB    Elimination Interventions: Call light in reach

## 2021-09-20 LAB
ANGIO CONVERTING ENZ, CSF: <1.5 U/L (ref 0–2.5)
AQP4 H2O CHANNEL IGG CSF QL: NORMAL
B BURGDOR DNA SPEC QL NAA+PROBE: NEGATIVE
NMDA RECEPTOR, CSF: NORMAL
SPECIMEN SOURCE: NORMAL

## 2021-09-22 ENCOUNTER — DOCUMENTATION ONLY (OUTPATIENT)
Dept: NEUROLOGY | Age: 21
End: 2021-09-22

## 2021-09-22 LAB
ALB CSF/SERPL: 2 {RATIO} (ref 0–8)
ALBUMIN CSF-MCNC: 8 MG/DL (ref 7–29)
ALBUMIN SERPL-MCNC: 3.8 G/DL (ref 3.9–5)
IGG CSF-MCNC: 3.1 MG/DL (ref 0–6.7)
IGG SERPL-MCNC: 1554 MG/DL (ref 586–1602)
IGG SYNTH RATE SER+CSF CALC-MRATE: 1.9 MG/DAY
IGG/ALB CLEAR SER+CSF-RTO: 0.9 (ref 0–0.7)
IGG/ALB CSF: 0.39 {RATIO} (ref 0–0.25)
OLIGOCLONAL BANDS.IT SER+CSF QL: ABNORMAL
VITAMIN E/ALPHA-TOCOPHEROL: 8.7 MG/L (ref 5.9–19.4)
VITAMIN E/GAMMA-TOCOPHEROL: 1.4 MG/L (ref 0.7–4.9)

## 2021-09-22 NOTE — PROGRESS NOTES
Left message for patient with the hospital follow up appt information. Also, mailed patient an appointment reminder.

## 2021-09-23 LAB
BACTERIA SPEC CULT: NORMAL
GRAM STN SPEC: NORMAL
SERVICE CMNT-IMP: NORMAL

## 2021-09-26 LAB — COPPER SERPL-MCNC: 131 UG/DL (ref 80–158)

## 2021-09-29 ENCOUNTER — TELEPHONE (OUTPATIENT)
Dept: NEUROLOGY | Age: 21
End: 2021-09-29

## 2021-09-30 LAB
APTT HEX PL PPP: 1 SEC
APTT IMM NP PPP: NORMAL SEC
APTT PPP 1:1 SALINE: NORMAL SEC
APTT PPP: 25.4 SEC
B2 GLYCOPROT1 IGA SER-ACNC: <10 SAU
B2 GLYCOPROT1 IGG SER-ACNC: <10 SGU
B2 GLYCOPROT1 IGM SER-ACNC: <10 SMU
CARDIOLIPIN IGA SER IA-ACNC: <10 APL
CARDIOLIPIN IGG SER IA-ACNC: <10 GPL
CARDIOLIPIN IGM SER IA-ACNC: <10 MPL
CONFIRM DRVVT: NORMAL SEC
LAC INTERPRETATION, 502038: NORMAL
PLATELET NEUTRALIZATION 500049: 1.4 SEC
PROTHROM IGG SERPL-ACNC: 8 G UNITS
PS IGG SER IA-ACNC: 0 GPS
PS IGM SER IA-ACNC: 1 MPS
SCREEN DRVVT/NORMAL: NORMAL RATIO
SCREEN DRVVT: 46.8 SEC

## 2021-10-04 NOTE — DISCHARGE SUMMARY
Physician Discharge Summary     Patient ID:  Esha Ace  [id number]  [address] [facility] y.o.  2000    Admit date: 9/13/2021    Discharge date and time: 9/19/2021    Admission Diagnoses: Optic neuritis [H46.9]    Discharge Diagnoses/Hospital Course   Ms. Cevallos is a [address] [facility] yo female with PMH of none admitted for optic neuritis. Acute optic neuritis/ Vision loss/ Demyelinating lesions: optic neuritis based on nerve edema, MRI report OP 9/08/21. MRI C-spine and T-spine show cord lesion. S/p LP. She improved with IV methypred daily x 5 days and was evaluated by neurology. RF neg, ACE neg, CAROL pending, anti-NMO neg, copper pending, T pallidum neg, HIV neg, Lyme neg, SS-A/SS-B neg. CSF oligoclonal bands pending. Will need close outpt neuro follow-up                Leukocytosis: likely 2/2 steroids. Procalcitonin low. No respiratory or urinary symptoms. No fevers. No ab pain.  CXR clear     PCP: GEORGIA Gooden     Consults: Neurology    Discharge Exam:  Visit Vitals  /70 (BP 1 Location: Right upper arm, BP Patient Position: At rest)   Pulse 69   Temp 98.2 °F (36.8 °C)   Resp 18   Ht 5' 7\" (1.702 m)   Wt 106.6 kg (235 lb)   SpO2 98%   BMI 36.81 kg/m²     Gen:  Well-developed, well-nourished, in no acute distress  HEENT:  Pink conjunctivae, PERRL, hearing intact to voice, moist mucous membranes  Neck:  Supple, no tenderness to palpation   Resp:  No accessory muscle use, clear breath sounds without wheezes rales or rhonchi  Card:  RRR, No murmurs, normal S1, S2, no peripheral edema  Abd:  Soft, non-tender, non-distended, normoactive bowel sounds are present  Musc:  No cyanosis or clubbing  Skin:  No rashes or ulcers, skin turgor is good  Neuro:  Cranial nerves 3-12 are grossly intact, no focal weakness, follows commands appropriately  Psych:  Oriented to person, place, and time, Alert with good insight  Disposition: home    Patient Instructions:   Discharge Medication List as of 9/19/2021 10:08 AM      CONTINUE these medications which have NOT CHANGED    Details   imipramine (TofraniL) 25 mg tablet Take 25 mg by mouth nightly. Last given by ER RN at 235 9/13/2021, Historical Med      oxybutynin chloride XL (DITROPAN XL) 10 mg CR tablet Take 10 mg by mouth daily. , Historical Med             Activity: Activity as tolerated  Diet: Resume previous diet  Wound Care: None needed    Follow-up with GEORGIA Craig   Follow-up Information     Follow up With Specialties Details Why Contact Info    Omayra Cordon MD Neurology Schedule an appointment as soon as possible for a visit Please see Dr. Stephanie Juárez for a second opinion [address] Dr Sreedhar Richardson MD Neurology, Pain Management  Call to make an appointment [facility] hospital follow up in 3 weeks with Dr Dyana Newell with Jennifer Culp to facilitate this appt as it will be a work in appointment [address]  [address] [facility] 99 [address] [place], [place] Physician Assistant Call To schedule a hospital follow-up with your Primary Care [address]  [phone]            Approximate time spent in patient care on day of discharge: 35 minutes     Signed:  Vianey Brice MD  10/4/2021  2:08 PM

## 2021-10-05 ENCOUNTER — OFFICE VISIT (OUTPATIENT)
Dept: NEUROLOGY | Age: 21
End: 2021-10-05
Payer: COMMERCIAL

## 2021-10-05 VITALS
RESPIRATION RATE: 16 BRPM | HEART RATE: 78 BPM | OXYGEN SATURATION: 99 % | BODY MASS INDEX: 36.57 KG/M2 | TEMPERATURE: 97.8 F | HEIGHT: 67 IN | WEIGHT: 233 LBS | SYSTOLIC BLOOD PRESSURE: 126 MMHG | DIASTOLIC BLOOD PRESSURE: 76 MMHG

## 2021-10-05 DIAGNOSIS — Z09 HOSPITAL DISCHARGE FOLLOW-UP: ICD-10-CM

## 2021-10-05 DIAGNOSIS — G37.9 DEMYELINATING DISEASE (HCC): ICD-10-CM

## 2021-10-05 DIAGNOSIS — H46.9 LEFT OPTIC NEURITIS: Primary | ICD-10-CM

## 2021-10-05 PROCEDURE — 99215 OFFICE O/P EST HI 40 MIN: CPT | Performed by: PSYCHIATRY & NEUROLOGY

## 2021-10-05 PROCEDURE — 1111F DSCHRG MED/CURRENT MED MERGE: CPT | Performed by: PSYCHIATRY & NEUROLOGY

## 2021-10-05 RX ORDER — PREDNISONE 20 MG/1
TABLET ORAL
Qty: 15 TABLET | Refills: 0 | Status: SHIPPED | OUTPATIENT
Start: 2021-10-05 | End: 2021-10-15

## 2021-10-05 NOTE — PROGRESS NOTES
Diane Degroot (2000) is a 23 y.o. female, established patient, here for evaluation of the following     Chief complaint(s):   Chief Complaint   Patient presents with   [facility] Follow Up      optic neuritis, patient is accompnaied by her mother        SUBJECTIVE/ OBJECTIVE:    HPI: 23 y.o. female     Following up after recent hospital admission for Left optic neuritis, spinal cord lesions (cervical and thoracic), no Brain lesions. Mother accompanies her. Extensive review of labs drawn in hospital and those are documented below in Brief Hx/ Prior Data. To summarize, only abnormality on CSF was MS panel showed 4 oligoclonal bands (evidence of CNS inflammatory process not specific to MS), serum NMO and MOG antibodies were both negative/ normal, and the remainder of the extensive lab evaluation was negative. I have already referred patient to see [address] [place] [address] (Dr Carey Palmer or Colleague, first available) for evaluation of possible Neuromyelitis Optica. All lab results, MRI reports, and Neurology notes were faxed there on 9- and Nurse called later on same day for confirmation that records were received. Reports left eye vision is back to 20/ 20, has been rechecked by Ophthalmologist.   Eduardo Rich to have intermittent/ episodic tingling sensation in hands  Has \"weird\" sensation across abdomen since her hospital admission.      Review of Systems: as above    ========================================    Brief Hx/ Prior Data:     See Hospital consult note dated 9- and subsequent neurology progress notes dated 9-15 to 9-17-21. MRI Brain/ Orbits with and without contrast (9/8/2021): 1. Small area of signal abnormality with associated enhancement along the lateral aspect of the intraorbital left optic nerve. It is unclear whether this is within the substance of the nerve itself, or within the optic nerve sheath.   If within the nerve itself, this would be consistent with active optic neuritis. If within the nerve sheath, primary differential considerations would include inflammatory/granulomatous processes such as neurosarcoidosis, and possibly optic sheath meningioma. Follow-up brain MRI of the orbits without and with contrast after treatment is recommended to assess for interval change. 2. Otherwise normal brain MRI. Specifically, no white matter lesions. MRI Cervical Spine with and without contrast (9-):   Alignment is normal.  There are no suspicious marrow lesions or evidence of acute bony trauma. Diffuse areas of cord signal abnormality posterior to C3 and C4 as well as C6, C7 and T1. No discrete enhancement. Paraspinous soft tissues are within normal limits. Craniocervical junction: Intact. Without stenosis. No significant disc degeneration, spina, or foraminal stenoses. IMPRESSION:  1. Multifocal areas of cord signal abnormality without abnormal enhancement compatible with demyelinating lesions given patient's history. MRI Thoracic Spine with and without contrast (9-): : Alignment of the thoracic spine is normal. There are no suspicious marrow lesions or evidence of fracture. There is diffuse abnormal signal within the thoracic cord with multifocal lesions extending from T1 through T10. No cord atrophy. No abnormal enhancement. There is no significant disc degeneration, bulge or protrusion. No significant canal or foraminal narrowing. Paraspinal soft tissues are unremarkable. IMPRESSION: 1. Extensive multifocal areas of cord signal abnormality but no abnormal enhancement. Given patient's constellation of present entering symptoms this is compatible with demyelination.     Labs:     CSF: Tube 1 (clear, colorless, 2 RBCs, 7 WBCs), tube 4 clear, colorless, 0 RBC, 2 WBC), Gram stain negative culture negative cryptococcal antigen negative protein 25 glucose 58, [place] ink negative (no encapsulated yeast), Lyme PCR negative, meningitis pathogens panel (multiple PCR test) all negative, CSF NMO IgG negative (< 1:1), NMDA receptor antibodies negative, angiotensin-converting enzyme less than 1.5 (normal range), multiple sclerosis panel elevated IgG to albumin ratio of 0.39, elevated IgG index 0.9, IgG synthesis rate 1.9, 4 oligoclonal bands were detected    Blood work (all of the following are normal or negative unless bolded/ abnormal): Serum anti-MOG (Cell based assay) negative, copper 131, vitamin D 22.9 (mild low), vitamin B12 451, TSH 0.97, free T4 0.9, thyroid peroxidase antibody 12, rheumatoid factor less than 10, paraneoplastic antibody panel (Anti-Hu/ RI, YO antibody profile) all negative, HIV 1 2 fourth-generation with reflex non-reactive, T. pallidum antibody by FTA-ABS non-reactive, Lyme antibody total with reflex less than 0.91 vitamin D 8.7, SSA less than 0.2, SSB less than 0.2, serum ACE 32, CAROL direct Red reflex negative, QuantiFERON-TB negative, Aquaporin Receptor Antibody less than 1.5 (-), CRP 1.38 (elevated), ESR 54 (elevated), Antiphospholipid antibody panel negative    Last CMP and CBC on 9/16/2021: Normal CMP except mild low calcium 8.0, mild low albumin 2.9, low AST 6. Elevated WBC count of 15.6 (on IV steroids at the time), mild low hemoglobin 10.8, mild low hematocrit 33.5, normal platelet count. No Known Allergies      Current Outpatient Medications:     predniSONE (DELTASONE) 20 mg tablet, Take 40 mg by mouth daily (with breakfast) for 5 days, THEN 20 mg daily (with breakfast) for 5 days. , Disp: 15 Tablet, Rfl: 0    imipramine (TofraniL) 25 mg tablet, Take 25 mg by mouth nightly. Last given by ER RN at [id number] 9/13/2021, Disp: , Rfl:     oxybutynin chloride XL (DITROPAN XL) 10 mg CR tablet, Take 10 mg by mouth daily. (Patient not taking: Reported on 9/14/2021), Disp: , Rfl:      has a past medical history of Chronic mental illness and Overactive bladder.      has a past surgical history that includes ir insert non tunl cvc over 5 yrs (9/15/2021) and ir insert non tunl cvc over 5 yrs (9/15/2021). Physical Exam:    Vitals:    10/05/21 1053   BP: 126/76   BP 1 Location: Left upper arm   BP Patient Position: Sitting   Pulse: 78   Temp: 97.8 °F (36.6 °C)   Resp: 16   Height: 5' 7\" (1.702 m)   Weight: 105.7 kg (233 lb)   SpO2: 99%     Awake, alert, resting, calm, conversant. EOMI. Visual fields normal bilateral.  5 out of 5 strength in all extremities. No resting or postural tremors. Intact light touch in all extremities. No spinal sensory level. Normal gait including tandem gait. Romberg exam is negative.    ========================================    ASSESSMENT/ PLAN:       ICD-10-CM ICD-9-CM    1. Left optic neuritis  H46.9 377.30 predniSONE (DELTASONE) 20 mg tablet   2. Demyelinating disease ([facility] [address]  G37.9 341.9 predniSONE (DELTASONE) 20 mg tablet      Left optic neuritis by hx/ Optho exam/ and imaging  Spinal cord lesions by MRI  No parenchymal brain lesions by MRI    Referred to [address] [place] [address] as noted above for evaluation of possible NMO    Advised pt/ mother to get a copy of MRI images on CD (Brain, C-spine, T-spine) so they can take to the visit at [facility] patient Rx of Prednisone 20 mg tablets to take IF she were to have recurrence of any neurologic symptoms. Pt is welcome to return here if she has any further neurologic issues before she can be seen at [address] [place] [address] Drive was used to authenticate this note.   -- Brandi Rowe MD

## 2021-10-05 NOTE — LETTER
10/5/2021    Patient: Nneka Galicia   YOB: 2000   Date of Visit: 10/5/2021     Tharon Kanner, [facility]  [address] [facility]  Via Fax: [phone]    Dear Tharon Kanner, PA,      Thank you for referring Ms. Verónica Mckeon to [facility] for evaluation. My notes for this consultation are attached. If you have questions, please do not hesitate to call me. I look forward to following your patient along with you.       Sincerely,    Nelson Bergman MD

## 2021-10-05 NOTE — PROGRESS NOTES
Chief Complaint   Patient presents with   [facility] Follow Up      optic neuritis, patient is accompnaied by her mother      Visit Vitals  /76 (BP 1 Location: Left upper arm, BP Patient Position: Sitting)   Pulse 78   Temp 97.8 °F (36.6 °C)   Resp 16   Ht 5' 7\" (1.702 m)   Wt 105.7 kg (233 lb)   SpO2 99%   BMI 36.49 kg/m²

## 2022-03-19 PROBLEM — H46.9 OPTIC NEURITIS: Status: ACTIVE | Noted: 2021-09-13

## 2023-04-07 ENCOUNTER — HOSPITAL ENCOUNTER (OUTPATIENT)
Age: 23
End: 2023-04-07
Attending: STUDENT IN AN ORGANIZED HEALTH CARE EDUCATION/TRAINING PROGRAM
Payer: COMMERCIAL

## 2023-04-07 ENCOUNTER — APPOINTMENT (OUTPATIENT)
Dept: GENERAL RADIOLOGY | Age: 23
End: 2023-04-07
Attending: EMERGENCY MEDICINE
Payer: COMMERCIAL

## 2023-04-16 ENCOUNTER — HOSPITAL ENCOUNTER (EMERGENCY)
Age: 23
Discharge: HOME | End: 2023-04-16
Attending: STUDENT IN AN ORGANIZED HEALTH CARE EDUCATION/TRAINING PROGRAM
Payer: COMMERCIAL

## 2023-04-16 ENCOUNTER — APPOINTMENT (OUTPATIENT)
Dept: GENERAL RADIOLOGY | Age: 23
End: 2023-04-16
Attending: STUDENT IN AN ORGANIZED HEALTH CARE EDUCATION/TRAINING PROGRAM
Payer: COMMERCIAL

## 2023-04-16 VITALS
SYSTOLIC BLOOD PRESSURE: 128 MMHG | RESPIRATION RATE: 18 BRPM | HEIGHT: 67 IN | DIASTOLIC BLOOD PRESSURE: 76 MMHG | HEART RATE: 49 BPM | BODY MASS INDEX: 39.55 KG/M2 | OXYGEN SATURATION: 97 % | TEMPERATURE: 97.9 F | WEIGHT: 252 LBS

## 2023-04-16 DIAGNOSIS — N30.00 ACUTE CYSTITIS WITHOUT HEMATURIA: ICD-10-CM

## 2023-04-16 DIAGNOSIS — R07.89 ATYPICAL CHEST PAIN: Primary | ICD-10-CM

## 2023-04-16 LAB
ALBUMIN SERPL-MCNC: 3.2 G/DL (ref 3.5–5)
ALBUMIN/GLOB SERPL: 0.7 (ref 1.1–2.2)
ALP SERPL-CCNC: 72 U/L (ref 45–117)
ALT SERPL-CCNC: 39 U/L (ref 12–78)
ANION GAP SERPL CALC-SCNC: 2 MMOL/L (ref 5–15)
APPEARANCE UR: ABNORMAL
AST SERPL-CCNC: 11 U/L (ref 15–37)
BACTERIA URNS QL MICRO: ABNORMAL /HPF
BASOPHILS # BLD: 0 K/UL (ref 0–0.1)
BASOPHILS NFR BLD: 0 % (ref 0–1)
BILIRUB SERPL-MCNC: 0.3 MG/DL (ref 0.2–1)
BILIRUB UR QL: NEGATIVE
BUN SERPL-MCNC: 14 MG/DL (ref 6–20)
BUN/CREAT SERPL: 20 (ref 12–20)
CALCIUM SERPL-MCNC: 8.2 MG/DL (ref 8.5–10.1)
CHLORIDE SERPL-SCNC: 112 MMOL/L (ref 97–108)
CO2 SERPL-SCNC: 26 MMOL/L (ref 21–32)
COLOR UR: ABNORMAL
COMMENT, HOLDF: NORMAL
CREAT SERPL-MCNC: 0.71 MG/DL (ref 0.55–1.02)
D DIMER PPP FEU-MCNC: 0.27 MG/L FEU (ref 0–0.65)
DIFFERENTIAL METHOD BLD: ABNORMAL
EOSINOPHIL # BLD: 0 K/UL (ref 0–0.4)
EOSINOPHIL NFR BLD: 0 % (ref 0–7)
EPITH CASTS URNS QL MICRO: ABNORMAL /LPF
ERYTHROCYTE [DISTWIDTH] IN BLOOD BY AUTOMATED COUNT: 14.1 % (ref 11.5–14.5)
GLOBULIN SER CALC-MCNC: 4.3 G/DL (ref 2–4)
GLUCOSE SERPL-MCNC: 161 MG/DL (ref 65–100)
GLUCOSE UR STRIP.AUTO-MCNC: 500 MG/DL
HCT VFR BLD AUTO: 31.7 % (ref 35–47)
HGB BLD-MCNC: 10.5 G/DL (ref 11.5–16)
HGB UR QL STRIP: NEGATIVE
IMM GRANULOCYTES # BLD AUTO: 0.2 K/UL (ref 0–0.04)
IMM GRANULOCYTES NFR BLD AUTO: 1 % (ref 0–0.5)
KETONES UR QL STRIP.AUTO: NEGATIVE MG/DL
LEUKOCYTE ESTERASE UR QL STRIP.AUTO: NEGATIVE
LYMPHOCYTES # BLD: 0.8 K/UL (ref 0.8–3.5)
LYMPHOCYTES NFR BLD: 4 % (ref 12–49)
MCH RBC QN AUTO: 28.2 PG (ref 26–34)
MCHC RBC AUTO-ENTMCNC: 33.1 G/DL (ref 30–36.5)
MCV RBC AUTO: 85.2 FL (ref 80–99)
MONOCYTES # BLD: 1.1 K/UL (ref 0–1)
MONOCYTES NFR BLD: 5 % (ref 5–13)
MUCOUS THREADS URNS QL MICRO: ABNORMAL /LPF
NEUTS SEG # BLD: 18.9 K/UL (ref 1.8–8)
NEUTS SEG NFR BLD: 90 % (ref 32–75)
NITRITE UR QL STRIP.AUTO: NEGATIVE
NRBC # BLD: 0 K/UL (ref 0–0.01)
NRBC BLD-RTO: 0 PER 100 WBC
PH UR STRIP: 6 (ref 5–8)
PLATELET # BLD AUTO: 312 K/UL (ref 150–400)
PMV BLD AUTO: 9.3 FL (ref 8.9–12.9)
POTASSIUM SERPL-SCNC: 3.7 MMOL/L (ref 3.5–5.1)
PROT SERPL-MCNC: 7.5 G/DL (ref 6.4–8.2)
PROT UR STRIP-MCNC: 30 MG/DL
RBC # BLD AUTO: 3.72 M/UL (ref 3.8–5.2)
RBC #/AREA URNS HPF: ABNORMAL /HPF (ref 0–5)
RBC MORPH BLD: ABNORMAL
SAMPLES BEING HELD,HOLD: NORMAL
SODIUM SERPL-SCNC: 140 MMOL/L (ref 136–145)
SP GR UR REFRACTOMETRY: >1.03 (ref 1–1.03)
TROPONIN I SERPL HS-MCNC: <4 NG/L (ref 0–51)
UR CULT HOLD, URHOLD: NORMAL
UROBILINOGEN UR QL STRIP.AUTO: 1 EU/DL (ref 0.2–1)
WBC # BLD AUTO: 21 K/UL (ref 3.6–11)
WBC URNS QL MICRO: ABNORMAL /HPF (ref 0–4)

## 2023-04-16 PROCEDURE — 85379 FIBRIN DEGRADATION QUANT: CPT

## 2023-04-16 PROCEDURE — 84484 ASSAY OF TROPONIN QUANT: CPT

## 2023-04-16 PROCEDURE — 71045 X-RAY EXAM CHEST 1 VIEW: CPT

## 2023-04-16 PROCEDURE — 80053 COMPREHEN METABOLIC PANEL: CPT

## 2023-04-16 PROCEDURE — 99285 EMERGENCY DEPT VISIT HI MDM: CPT

## 2023-04-16 PROCEDURE — 74011250637 HC RX REV CODE- 250/637: Performed by: STUDENT IN AN ORGANIZED HEALTH CARE EDUCATION/TRAINING PROGRAM

## 2023-04-16 PROCEDURE — 81001 URINALYSIS AUTO W/SCOPE: CPT

## 2023-04-16 PROCEDURE — 74011000250 HC RX REV CODE- 250: Performed by: STUDENT IN AN ORGANIZED HEALTH CARE EDUCATION/TRAINING PROGRAM

## 2023-04-16 PROCEDURE — 71046 X-RAY EXAM CHEST 2 VIEWS: CPT

## 2023-04-16 PROCEDURE — 36415 COLL VENOUS BLD VENIPUNCTURE: CPT

## 2023-04-16 PROCEDURE — 85025 COMPLETE CBC W/AUTO DIFF WBC: CPT

## 2023-04-16 PROCEDURE — 87086 URINE CULTURE/COLONY COUNT: CPT

## 2023-04-16 RX ORDER — NITROFURANTOIN 25; 75 MG/1; MG/1
100 CAPSULE ORAL 2 TIMES DAILY
Qty: 6 CAPSULE | Refills: 0 | Status: SHIPPED | OUTPATIENT
Start: 2023-04-16 | End: 2023-04-19

## 2023-04-16 RX ORDER — PANTOPRAZOLE SODIUM 40 MG/1
40 TABLET, DELAYED RELEASE ORAL DAILY
Qty: 20 TABLET | Refills: 0 | Status: SHIPPED | OUTPATIENT
Start: 2023-04-16 | End: 2023-05-06

## 2023-04-16 RX ORDER — HYDROCODONE BITARTRATE AND ACETAMINOPHEN 5; 325 MG/1; MG/1
1 TABLET ORAL
Qty: 9 TABLET | Refills: 0 | Status: SHIPPED | OUTPATIENT
Start: 2023-04-16 | End: 2023-04-19

## 2023-04-16 RX ADMIN — LIDOCAINE HYDROCHLORIDE 40 ML: 20 SOLUTION ORAL; TOPICAL at 09:52

## 2023-04-17 LAB
BACTERIA SPEC CULT: NORMAL
CC UR VC: NORMAL
SERVICE CMNT-IMP: NORMAL

## 2023-07-18 ENCOUNTER — HOSPITAL ENCOUNTER (OUTPATIENT)
Facility: HOSPITAL | Age: 23
Discharge: HOME | End: 2023-07-21
Payer: COMMERCIAL

## 2023-07-18 DIAGNOSIS — M54.16 LUMBAR RADICULOPATHY: ICD-10-CM

## 2023-07-18 DIAGNOSIS — G35 MULTIPLE SCLEROSIS (HCC): ICD-10-CM

## 2023-07-18 PROCEDURE — 72148 MRI LUMBAR SPINE W/O DYE: CPT

## 2025-03-17 ENCOUNTER — HOSPITAL ENCOUNTER (EMERGENCY)
Facility: HOSPITAL | Age: 25
Discharge: HOME | End: 2025-03-17
Attending: EMERGENCY MEDICINE
Payer: MEDICAID

## 2025-03-17 ENCOUNTER — APPOINTMENT (OUTPATIENT)
Facility: HOSPITAL | Age: 25
End: 2025-03-17
Payer: MEDICAID

## 2025-03-17 VITALS
RESPIRATION RATE: 16 BRPM | HEIGHT: 67 IN | DIASTOLIC BLOOD PRESSURE: 71 MMHG | OXYGEN SATURATION: 100 % | TEMPERATURE: 98.2 F | WEIGHT: 150 LBS | SYSTOLIC BLOOD PRESSURE: 105 MMHG | BODY MASS INDEX: 23.54 KG/M2 | HEART RATE: 75 BPM

## 2025-03-17 DIAGNOSIS — J03.90 ACUTE TONSILLITIS, UNSPECIFIED ETIOLOGY: Primary | ICD-10-CM

## 2025-03-17 LAB
ALBUMIN SERPL-MCNC: 3.3 G/DL (ref 3.5–5)
ALBUMIN/GLOB SERPL: 1 (ref 1.1–2.2)
ALP SERPL-CCNC: 51 U/L (ref 45–117)
ALT SERPL-CCNC: 14 U/L (ref 12–78)
ANION GAP SERPL CALC-SCNC: 4 MMOL/L (ref 2–12)
AST SERPL-CCNC: 11 U/L (ref 15–37)
BASOPHILS # BLD: 0.03 K/UL (ref 0–0.1)
BASOPHILS NFR BLD: 0.6 % (ref 0–1)
BILIRUB SERPL-MCNC: 1.9 MG/DL (ref 0.2–1)
BUN SERPL-MCNC: 12 MG/DL (ref 6–20)
BUN/CREAT SERPL: 17 (ref 12–20)
CALCIUM SERPL-MCNC: 8.5 MG/DL (ref 8.5–10.1)
CHLORIDE SERPL-SCNC: 108 MMOL/L (ref 97–108)
CO2 SERPL-SCNC: 25 MMOL/L (ref 21–32)
CREAT SERPL-MCNC: 0.72 MG/DL (ref 0.55–1.02)
DIFFERENTIAL METHOD BLD: ABNORMAL
EOSINOPHIL # BLD: 0.06 K/UL (ref 0–0.4)
EOSINOPHIL NFR BLD: 1.1 % (ref 0–7)
ERYTHROCYTE [DISTWIDTH] IN BLOOD BY AUTOMATED COUNT: 11.9 % (ref 11.5–14.5)
GLOBULIN SER CALC-MCNC: 3.2 G/DL (ref 2–4)
GLUCOSE SERPL-MCNC: 93 MG/DL (ref 65–100)
HCT VFR BLD AUTO: 31.9 % (ref 35–47)
HGB BLD-MCNC: 10.7 G/DL (ref 11.5–16)
IMM GRANULOCYTES # BLD AUTO: 0.01 K/UL (ref 0–0.04)
IMM GRANULOCYTES NFR BLD AUTO: 0.2 % (ref 0–0.5)
LYMPHOCYTES # BLD: 1.26 K/UL (ref 0.8–3.5)
LYMPHOCYTES NFR BLD: 23.9 % (ref 12–49)
MCH RBC QN AUTO: 30.2 PG (ref 26–34)
MCHC RBC AUTO-ENTMCNC: 33.5 G/DL (ref 30–36.5)
MCV RBC AUTO: 90.1 FL (ref 80–99)
MONOCYTES # BLD: 0.46 K/UL (ref 0–1)
MONOCYTES NFR BLD: 8.7 % (ref 5–13)
NEUTS SEG # BLD: 3.45 K/UL (ref 1.8–8)
NEUTS SEG NFR BLD: 65.5 % (ref 32–75)
NRBC # BLD: 0 K/UL (ref 0–0.01)
NRBC BLD-RTO: 0 PER 100 WBC
PLATELET # BLD AUTO: 167 K/UL (ref 150–400)
PMV BLD AUTO: 9.1 FL (ref 8.9–12.9)
POTASSIUM SERPL-SCNC: 3.8 MMOL/L (ref 3.5–5.1)
PROT SERPL-MCNC: 6.5 G/DL (ref 6.4–8.2)
RBC # BLD AUTO: 3.54 M/UL (ref 3.8–5.2)
SODIUM SERPL-SCNC: 137 MMOL/L (ref 136–145)
WBC # BLD AUTO: 5.3 K/UL (ref 3.6–11)

## 2025-03-17 PROCEDURE — 85025 COMPLETE CBC W/AUTO DIFF WBC: CPT

## 2025-03-17 PROCEDURE — 80053 COMPREHEN METABOLIC PANEL: CPT

## 2025-03-17 PROCEDURE — 70491 CT SOFT TISSUE NECK W/DYE: CPT

## 2025-03-17 PROCEDURE — 6360000004 HC RX CONTRAST MEDICATION: Performed by: EMERGENCY MEDICINE

## 2025-03-17 PROCEDURE — 36415 COLL VENOUS BLD VENIPUNCTURE: CPT

## 2025-03-17 PROCEDURE — 6360000002 HC RX W HCPCS

## 2025-03-17 PROCEDURE — 99285 EMERGENCY DEPT VISIT HI MDM: CPT

## 2025-03-17 RX ORDER — DEXAMETHASONE 4 MG/1
10 TABLET ORAL ONCE
Status: COMPLETED | OUTPATIENT
Start: 2025-03-17 | End: 2025-03-17

## 2025-03-17 RX ORDER — IOPAMIDOL 755 MG/ML
100 INJECTION, SOLUTION INTRAVASCULAR
Status: COMPLETED | OUTPATIENT
Start: 2025-03-17 | End: 2025-03-17

## 2025-03-17 RX ORDER — IBUPROFEN 600 MG/1
600 TABLET, FILM COATED ORAL EVERY 6 HOURS PRN
Qty: 120 TABLET | Refills: 3 | Status: SHIPPED | OUTPATIENT
Start: 2025-03-17 | End: 2025-03-17

## 2025-03-17 RX ADMIN — DEXAMETHASONE 10 MG: 4 TABLET ORAL at 17:14

## 2025-03-17 RX ADMIN — IOPAMIDOL 100 ML: 755 INJECTION, SOLUTION INTRAVENOUS at 16:53

## 2025-03-17 ASSESSMENT — PAIN SCALES - GENERAL: PAINLEVEL_OUTOF10: 2

## 2025-03-17 ASSESSMENT — PAIN DESCRIPTION - DESCRIPTORS: DESCRIPTORS: BURNING

## 2025-03-17 ASSESSMENT — PAIN DESCRIPTION - ORIENTATION: ORIENTATION: INNER

## 2025-03-17 ASSESSMENT — PAIN - FUNCTIONAL ASSESSMENT: PAIN_FUNCTIONAL_ASSESSMENT: 0-10

## 2025-03-17 ASSESSMENT — ENCOUNTER SYMPTOMS: SORE THROAT: 1

## 2025-03-17 ASSESSMENT — PAIN DESCRIPTION - LOCATION: LOCATION: THROAT

## 2025-03-17 NOTE — ED PROVIDER NOTES
[facility] EMERGENCY DEPARTMENT  EMERGENCY DEPARTMENT ENCOUNTER      Pt Name: Mehreen Enrique  MRN: [id number]  Birthdate 2000  Date of evaluation: 3/17/2025  Provider: Aleksandr Jenkins PA-C    CHIEF COMPLAINT       Chief Complaint   Patient presents with    Pharyngitis         HISTORY OF PRESENT ILLNESS   (Location/Symptom, Timing/Onset, Context/Setting, Quality, Duration, Modifying Factors, Severity)  Note limiting factors.   HPI  24-year-old female who presents with a sore throat.  She reports a frequent history of tonsillitis.  She will do tonsillitis a couple times a year.  She reports that since February she has had a sore throat, she has been on multiple rounds of antibiotics, penicillin, azithromycin without improvement.  She reports being tested for strep, mono, COVID, flu and all being negative.  Additionally she was placed on a course of steroids in January.  She reports that her pain is worse with swallowing.  She is additionally having difficulty eating, breathing, sleeping due to the increased swelling of her tonsils.  She also reports congestion and a lots of mucus production.  She denies fevers.    Review of External Medical Records:     Nursing Notes were reviewed.    REVIEW OF SYSTEMS    (2-9 systems for level 4, 10 or more for level 5)     Review of Systems   HENT:  Positive for sore throat.        Except as noted above the remainder of the review of systems was reviewed and negative.       PAST MEDICAL HISTORY     Past Medical History:   Diagnosis Date    Chronic mental illness     Overactive bladder          SURGICAL HISTORY       Past Surgical History:   Procedure Laterality Date    IR NONTUNNELED VASCULAR CATHETER > 5 YEARS  9/15/2021    IR NONTUNNELED VASCULAR CATHETER 9/15/2021 [facility]    IR NONTUNNELED VASCULAR CATHETER > 5 YEARS  9/15/2021    IR NONTUNNELED VASCULAR CATHETER 9/15/2021 [facility]    IR NONTUNNELED VASCULAR CATHETER > 5 YEARS

## 2025-03-17 NOTE — ED TRIAGE NOTES
Pt arrives to ED via POV ambulatory c/o swollen tonsils at baseline but states they are getting more swollen since February 2025. Pt reports increase in mucous. Pt states she started on PCN and has been to Urgent Care and [facility]. Pt denies improvement in her symptoms and states it is difficult to eat, breathe, and sleep due to the increased swelling. Hx MS